# Patient Record
Sex: FEMALE | Race: WHITE | NOT HISPANIC OR LATINO | Employment: OTHER | URBAN - METROPOLITAN AREA
[De-identification: names, ages, dates, MRNs, and addresses within clinical notes are randomized per-mention and may not be internally consistent; named-entity substitution may affect disease eponyms.]

---

## 2018-01-04 ENCOUNTER — TELEPHONE (OUTPATIENT)
Dept: OBSTETRICS AND GYNECOLOGY | Facility: CLINIC | Age: 63
End: 2018-01-04

## 2018-01-04 NOTE — TELEPHONE ENCOUNTER
----- Message from Suellen Liang sent at 12/29/2017  5:48 PM CST -----  Patient is due for yearly mammogram   Can an order be entered so that  Patient can be scheduled

## 2018-02-05 ENCOUNTER — TELEPHONE (OUTPATIENT)
Dept: OBSTETRICS AND GYNECOLOGY | Facility: CLINIC | Age: 63
End: 2018-02-05

## 2018-02-05 DIAGNOSIS — Z12.31 ENCOUNTER FOR SCREENING MAMMOGRAM FOR MALIGNANT NEOPLASM OF BREAST: ICD-10-CM

## 2018-02-05 DIAGNOSIS — R92.8 ABNORMAL SCREENING MAMMOGRAM: Primary | ICD-10-CM

## 2018-02-05 NOTE — TELEPHONE ENCOUNTER
----- Message from Clotilde Montemayor sent at 2/5/2018  9:32 AM CST -----  Contact: [phone]/self  Patient would like orders put in the system for a Mammo. Please advise.

## 2018-02-19 ENCOUNTER — OFFICE VISIT (OUTPATIENT)
Dept: OBSTETRICS AND GYNECOLOGY | Facility: CLINIC | Age: 63
End: 2018-02-19
Payer: MEDICARE

## 2018-02-19 VITALS
BODY MASS INDEX: 39 KG/M2 | WEIGHT: 242.63 LBS | HEIGHT: 66 IN | DIASTOLIC BLOOD PRESSURE: 70 MMHG | SYSTOLIC BLOOD PRESSURE: 126 MMHG

## 2018-02-19 DIAGNOSIS — Z01.419 WELL WOMAN EXAM WITH ROUTINE GYNECOLOGICAL EXAM: Primary | ICD-10-CM

## 2018-02-19 PROCEDURE — 99999 PR PBB SHADOW E&M-EST. PATIENT-LVL III: CPT | Mod: PBBFAC,,, | Performed by: OBSTETRICS & GYNECOLOGY

## 2018-02-19 PROCEDURE — G0101 CA SCREEN;PELVIC/BREAST EXAM: HCPCS | Mod: S$GLB,,, | Performed by: OBSTETRICS & GYNECOLOGY

## 2018-02-19 RX ORDER — DULOXETIN HYDROCHLORIDE 30 MG/1
CAPSULE, DELAYED RELEASE ORAL
COMMUNITY
Start: 2018-01-07

## 2018-02-19 RX ORDER — APIXABAN 5 MG/1
TABLET, FILM COATED ORAL
COMMUNITY
Start: 2018-01-08

## 2018-02-19 RX ORDER — THYROID 120 MG/1
120 TABLET ORAL
COMMUNITY
Start: 2017-10-25

## 2018-02-19 RX ORDER — AMIODARONE HYDROCHLORIDE 200 MG/1
100 TABLET ORAL
COMMUNITY
Start: 2017-05-12

## 2018-02-19 RX ORDER — DOCUSATE CALCIUM 240 MG
CAPSULE ORAL
COMMUNITY
Start: 2016-04-06

## 2018-02-19 NOTE — PROGRESS NOTES
CC: Annual check-up    SUBJECTIVE:   62 y.o. female   for annual routine Pap and checkup. No LMP recorded. Patient has had a hysterectomy..  She has no unusual complaints. She is scheduled for gastric sleeve surgery in 2018.        Past Medical History:   Diagnosis Date    Atrial fibrillation 10/2017    Congestive heart failure     Diabetes mellitus     Psoriasis     Thyroid disease      Past Surgical History:   Procedure Laterality Date    ADRENALECTOMY      ATRIAL ABLATION SURGERY  2017    BACK SURGERY      CARPAL TUNNEL RELEASE       SECTION      CHOLECYSTECTOMY      HYSTERECTOMY      KNEE ARTHROSCOPY      THYROIDECTOMY       Social History     Social History    Marital status:      Spouse name: N/A    Number of children: N/A    Years of education: N/A     Occupational History    Not on file.     Social History Main Topics    Smoking status: Former Smoker    Smokeless tobacco: Former User    Alcohol use No    Drug use: No    Sexual activity: Not Currently     Other Topics Concern    Not on file     Social History Narrative    No narrative on file     Family History   Problem Relation Age of Onset    Breast cancer Maternal Aunt     Cancer Father      gallbladder    Cancer Mother      melanom    Cancer Brother      kidney     OB History    Para Term  AB Living   2 2 1 1   2   SAB TAB Ectopic Multiple Live Births                  # Outcome Date GA Lbr Janes/2nd Weight Sex Delivery Anes PTL Lv   2      F CS-LVertical      1 Term     M CS-LVertical               Current Outpatient Prescriptions   Medication Sig Dispense Refill    allopurinol (ZYLOPRIM) 300 MG tablet       amiodarone (PACERONE) 200 MG Tab Take 100 mg by mouth.      aspirin (ECOTRIN) 81 MG EC tablet       blood sugar diagnostic (TRUETEST TEST STRIPS) Strp       blood-glucose meter (New[employer]OURCE BLOOD GLUC METER) Misc       bumetanide (BUMEX) 2 MG tablet Take 1 tablet by  mouth.      cetirizine (ZYRTEC) 10 MG tablet Take 1 tablet by mouth.      docusate calcium (SURFAK) 240 mg capsule continuous prn.      DULoxetine (CYMBALTA) 30 MG capsule       ELIQUIS 5 mg Tab       EUCALYPT/MEN/CAMPH/TURP/WH.PET (MEDICATED CHEST RUB TOP)       gabapentin (NEURONTIN) 800 MG tablet Take 1 tablet by mouth.      lancets (TRUEPLUS LANCETS) 28 gauge Misc       losartan (COZAAR) 100 MG tablet Take 1 tablet by mouth.      metformin (GLUCOPHAGE) 500 MG tablet Take 1 tablet by mouth.      metoprolol succinate (TOPROL-XL) 25 MG 24 hr tablet Take 1 tablet by mouth.      multivit-min-FA-lycopen-lutein (CENTRUM SILVER) 0.4-300-250 mg-mcg-mcg Tab Take by mouth.      thyroid, pork, (ARMOUR THYROID) 120 mg Tab Take 120 mg by mouth.      DEXTROSE (GLUCOSE) Chew Take 1 tablet by mouth.      rmtay-wtdlh-tuiueut-pramoxine 3.5-500-10,000 mg-unit-unit/g Oint       ejccm-adjxf-gjvfszt-pramoxine 3.5-500-10,000 mg-unit-unit/g Oint        No current facility-administered medications for this visit.      Allergies: Contrast media; Iodinated contrast- oral and iv dye; Atorvastatin; Pravastatin; and Rosuvastatin     ROS:  Constitutional: no weight loss, weight gain, fever, fatigue  Eyes:  No vision changes, glasses/contacts  ENT/Mouth: No ulcers, sinus problems, ears ringing, headache  Cardiovascular: No inability to lie flat, chest pain, exercise intolerance, swelling, heart palpitations  Respiratory: No wheezing, coughing blood, shortness of breath, or cough  Gastrointestinal: No diarrhea, bloody stool, nausea/vomiting, constipation, gas, hemorrhoids  Genitourinary: No blood in urine, painful urination, urgency of urination, frequency of urination, incomplete emptying, incontinence, abnormal bleeding, painful periods, heavy periods, vaginal discharge, vaginal odor, painful intercourse, sexual problems, bleeding after intercourse.  Musculoskeletal: No muscle weakness  Skin/Breast: No painful breasts, nipple  "discharge, masses, rash, ulcers  Neurological: No passing out, seizures, numbness, headache  Endocrine: No diabetes, hypothyroid, hyperthyroid, hot flashes, hair loss, abnormal hair growth, ance  Psychiatric: No depression, crying  Hematologic: No bruises, bleeding, swollen lymph nodes, anemia.      OBJECTIVE:   The patient appears well, alert, oriented x 3, in no distress.  /70   Ht 5' 6" (1.676 m)   Wt 110 kg (242 lb 9.6 oz)   BMI 39.16 kg/m²   NECK: no thyromegaly, trachea midline  SKIN: no acne, striae, hirsutism  BREAST EXAM: breasts appear normal, no suspicious masses, no skin or nipple changes or axillary nodes, small subcutaneous lymph node on left lateral wall, implantable device over left medial breast  ABDOMEN: no hernias, masses, or hepatosplenomegaly  GENITALIA: normal external genitalia, no erythema, no discharge  URETHRA: normal urethra, normal urethral meatus  VAGINA: mild vaginal atrophy  CERVIX: no lesions or cervical motion tenderness and absent  UTERUS: uterus absent  ADNEXA: absent      ASSESSMENT:   well woman  1. Well woman exam with routine gynecological exam        PLAN:   mammogram  return annually or prn     "

## 2018-02-27 ENCOUNTER — HOSPITAL ENCOUNTER (OUTPATIENT)
Dept: RADIOLOGY | Facility: HOSPITAL | Age: 63
Discharge: HOME | End: 2018-02-27
Attending: OBSTETRICS & GYNECOLOGY
Payer: MEDICARE

## 2018-02-27 DIAGNOSIS — Z12.31 ENCOUNTER FOR SCREENING MAMMOGRAM FOR MALIGNANT NEOPLASM OF BREAST: ICD-10-CM

## 2018-02-27 DIAGNOSIS — R92.8 ABNORMAL SCREENING MAMMOGRAM: ICD-10-CM

## 2018-02-27 PROCEDURE — 77067 SCR MAMMO BI INCL CAD: CPT | Mod: TC,PO

## 2019-01-07 DIAGNOSIS — G44.221 CHRONIC TENSION-TYPE HEADACHE, INTRACTABLE: Primary | ICD-10-CM

## 2019-01-11 ENCOUNTER — HOSPITAL ENCOUNTER (OUTPATIENT)
Dept: RADIOLOGY | Facility: HOSPITAL | Age: 64
Discharge: HOME | End: 2019-01-11
Attending: FAMILY MEDICINE
Payer: MEDICARE

## 2019-01-11 DIAGNOSIS — G44.221 CHRONIC TENSION-TYPE HEADACHE, INTRACTABLE: ICD-10-CM

## 2019-01-11 PROCEDURE — 70450 CT HEAD/BRAIN W/O DYE: CPT | Mod: TC,PO,ER

## 2020-03-27 ENCOUNTER — PATIENT MESSAGE (OUTPATIENT)
Dept: UROLOGY | Facility: CLINIC | Age: 65
End: 2020-03-27

## 2020-04-02 ENCOUNTER — PATIENT MESSAGE (OUTPATIENT)
Dept: UROLOGY | Facility: CLINIC | Age: 65
End: 2020-04-02

## 2020-04-02 ENCOUNTER — OFFICE VISIT (OUTPATIENT)
Dept: UROLOGY | Facility: CLINIC | Age: 65
End: 2020-04-02
Payer: MEDICARE

## 2020-04-02 DIAGNOSIS — R31.0 GROSS HEMATURIA: Primary | ICD-10-CM

## 2020-04-02 DIAGNOSIS — Z87.442 HISTORY OF NEPHROLITHIASIS: ICD-10-CM

## 2020-04-02 DIAGNOSIS — Z91.041 ALLERGY TO INTRAVENOUS CONTRAST: ICD-10-CM

## 2020-04-02 DIAGNOSIS — Z79.01 LONG TERM (CURRENT) USE OF ANTICOAGULANTS: ICD-10-CM

## 2020-04-02 PROCEDURE — 99204 OFFICE O/P NEW MOD 45 MIN: CPT | Mod: 95,,, | Performed by: STUDENT IN AN ORGANIZED HEALTH CARE EDUCATION/TRAINING PROGRAM

## 2020-04-02 PROCEDURE — 99204 PR OFFICE/OUTPT VISIT, NEW, LEVL IV, 45-59 MIN: ICD-10-PCS | Mod: 95,,, | Performed by: STUDENT IN AN ORGANIZED HEALTH CARE EDUCATION/TRAINING PROGRAM

## 2020-04-02 RX ORDER — PREDNISONE 50 MG/1
TABLET ORAL
Qty: 3 TABLET | Refills: 0 | Status: SHIPPED | OUTPATIENT
Start: 2020-04-02

## 2020-04-02 NOTE — PROGRESS NOTES
Urology Telemedicine Encounter      Subjective:       Patient ID: Deborah Gamboa is a 64 y.o. female.    Chief Complaint:  hematuria  This is a 64 y.o.  female patient that is new to me.  she is referred to me by her PCP Dr. Aurelio Renee for gross hematuria.     The patient location is: [place], [place].   The chief complaint leading to consultation is: gross hematuria  Visit type: Virtual visit with synchronous audio and video  Total time spent with patient: 20 minutes  Each patient to whom he or she provides medical services by telemedicine is:  (1) informed of the relationship between the physician and patient and the respective role of any other health care provider with respect to management of the patient; and (2) notified that he or she may decline to receive medical services by telemedicine and may withdraw from such care at any time.    The patient understands that [facility] urology department typically does not utilize telemedicine, we are adapting this technology for the time being to try to avoid patients from being exposed to COVID-19 / coronavirus as per the recommendations of [facility] administration as well as the [facility] Department of Health.   I also recommend that insurance companies consider waiving a copay, if the patient is responsible for one, for telemedicine health services during the current healthcare climate dealing with a COVID-19 outbreak.     Notes:    The patient did experience gross hematuria for the first time last week. She notes it has resolved since it started last Friday and her urine is now clear again. Denies associated flank pain, fevers, chills, dysuria. She does have a family history that is significant - her brother was diagnosed with kidney cancer that sounds like it was metastatic, diagnosed in 40s, passed away in late 50s.   The patient does take blood thinners  Eliquis.   @HE@ does have a history of stones. Did not pass stones, no  surgeries, informed from imaging.   The patient does have a history of smoking. Quit 21 years ago. 15 years total.   Associated symptoms -     Had workup before- no.    ---  Past Medical History:   Diagnosis Date    Atrial fibrillation 10/2017    Congestive heart failure     Diabetes mellitus     Psoriasis     Thyroid disease        Past Surgical History:   Procedure Laterality Date    ADRENALECTOMY      ATRIAL ABLATION SURGERY  2017    BACK SURGERY      CARPAL TUNNEL RELEASE       SECTION      CHOLECYSTECTOMY      HYSTERECTOMY      KNEE ARTHROSCOPY      THYROIDECTOMY         Family History   Problem Relation Age of Onset    Breast cancer Maternal Aunt     Cancer Father         gallbladder    Cancer Mother         melanom    Cancer Brother         kidney       Social History     Tobacco Use    Smoking status: Former Smoker    Smokeless tobacco: Former User   Substance Use Topics    Alcohol use: No    Drug use: No       Current Outpatient Medications on File Prior to Visit   Medication Sig Dispense Refill    allopurinol (ZYLOPRIM) 300 MG tablet       amiodarone (PACERONE) 200 MG Tab Take 100 mg by mouth.      blood sugar diagnostic (TRUETEST TEST STRIPS) Strp       blood-glucose meter (EDMdesignerOUR[employer]ii BLOOD GLUC METER) Misc       bumetanide (BUMEX) 2 MG tablet Take 1 tablet by mouth.      cetirizine (ZYRTEC) 10 MG tablet Take 1 tablet by mouth.      DEXTROSE (GLUCOSE) Chew Take 1 tablet by mouth.      docusate calcium (SURFAK) 240 mg capsule continuous prn.      DULoxetine (CYMBALTA) 30 MG capsule       ELIQUIS 5 mg Tab       EUCALYPT/MEN/CAMPH/TURP/WH.PET (MEDICATED CHEST RUB TOP)       gabapentin (NEURONTIN) 800 MG tablet Take 1 tablet by mouth.      lancets (TRUEPLUS LANCETS) 28 gauge Misc       losartan (COZAAR) 100 MG tablet Take 1 tablet by mouth.      metformin (GLUCOPHAGE) 500 MG tablet Take 1 tablet by mouth.      metoprolol succinate (TOPROL-XL) 25 MG 24 hr  tablet Take 1 tablet by mouth.      multivit-min-FA-lycopen-lutein (CENTRUM SILVER) 0.4-300-250 mg-mcg-mcg Tab Take by mouth.      ujihn-thbyn-ourumco-pramoxine 3.5-500-10,000 mg-unit-unit/g Oint       rblcc-nckpv-ciufkri-pramoxine 3.5-500-10,000 mg-unit-unit/g Oint       thyroid, pork, (ARMOUR THYROID) 120 mg Tab Take 120 mg by mouth.      [DISCONTINUED] aspirin (ECOTRIN) 81 MG EC tablet        No current facility-administered medications on file prior to visit.      Review of patient's allergies indicates:   Allergen Reactions    Contrast media Hives and Shortness Of Breath    Iodinated contrast media Hives and Shortness Of Breath    Atorvastatin      cramps    Pravastatin      cramps    Rosuvastatin      cramps       Review of Systems   Constitutional: Negative for activity change.   HENT: Negative for congestion.    Eyes: Negative for visual disturbance.   Respiratory: Negative for shortness of breath.    Cardiovascular: Negative for chest pain.   Gastrointestinal: Negative for abdominal distention.   Musculoskeletal: Negative for gait problem.   Skin: Negative for color change.   Neurological: Negative for dizziness.   Psychiatric/Behavioral: Negative for agitation.       Objective:      Physical Exam   Constitutional: She is oriented to person, place, and time. She appears well-developed and well-nourished.   HENT:   Head: Normocephalic and atraumatic.   Eyes: Pupils are equal, round, and reactive to light. EOM are normal.   Neck: Normal range of motion.   Pulmonary/Chest: Effort normal.   Neurological: She is alert and oriented to person, place, and time.   Psychiatric: She has a normal mood and affect. Her behavior is normal. Judgment and thought content normal.         Please note that a full physical examination could not be performed due to the limitations of a telemedicine visit.   Assessment:       1. Gross hematuria    2. History of nephrolithiasis    3. Long term (current) use of  anticoagulants    4. Allergy to intravenous contrast        Plan:       1. The patient has experienced gross hematuria. I counseled the patient on the American Urology Guidelines for a hematuria workup.  The criteria for microscopic hematuria is 3 red blood cells on microscopic urinalysis and the workup is recommended for any patient experiencing gross hematuria. The workup includes a CT urogram and a flexible cystoscopy performed here in the clinic. After answering all of the questions about the workup the patient was amenable in proceeding.  2. Prednisone and Benadryl premedication before CT scan. Instructions sent to patient via portal.     Prednisone - 50 mg by mouth at 13 hours, 7 hours, and 1 hour before contrast media injection,   plus   Diphenhydramine (Benadryl®) - 50 mg by mouth 1 hour before contrast medium.    It is also good practice to hydrate generously with oral fluids until the time you are instructed to fast for the CT scan.     3. Takes metformin once scheduled will need to recheck BMP 3 days after CT to make sure can restart metformin.    4. I called Dr. Renee's office to get BMP results,  informed me they are closed. Instructed me to call tomorrow. Once BMP results demonstrate stable renal function, will schedule CT urogram and cystoscopy - must be after 4/22. Can get Ct and bloodwork in [place]. Schedule cysto with me on a different day.       Gross hematuria  -     predniSONE (DELTASONE) 50 MG Tab; Take 50 mg by mouth at 13 hours, 7 hours, and 1 hour before contrast media injection for CT scan  Dispense: 3 tablet; Refill: 0    History of nephrolithiasis  -     predniSONE (DELTASONE) 50 MG Tab; Take 50 mg by mouth at 13 hours, 7 hours, and 1 hour before contrast media injection for CT scan  Dispense: 3 tablet; Refill: 0    Long term (current) use of anticoagulants  -     predniSONE (DELTASONE) 50 MG Tab; Take 50 mg by mouth at 13 hours, 7 hours, and 1 hour before contrast media  injection for CT scan  Dispense: 3 tablet; Refill: 0    Allergy to intravenous contrast  -     predniSONE (DELTASONE) 50 MG Tab; Take 50 mg by mouth at 13 hours, 7 hours, and 1 hour before contrast media injection for CT scan  Dispense: 3 tablet; Refill: 0

## 2020-04-02 NOTE — PROGRESS NOTES
"Subjective:       Patient ID: Deborah Gamboa is a 64 y.o. female.    Chief Complaint: No chief complaint on file.   ***  This is a 64 y.o.  female patient that is {Blank single:51991::"new to me.","new to me but not new to the system.","an established patient of mine."}  The patient is {Blank single:29387::"self referred","referred to me by"} *** for ***.         LAST PSA  No results found for: PSA, PSADIAG, PSATOTAL, PSAFREE    No results found for: CREATININE    I personally reviewed the images: ***  No results found in the last 24 hours.      ---  Past Medical History:   Diagnosis Date    Atrial fibrillation 10/2017    Congestive heart failure     Diabetes mellitus     Psoriasis     Thyroid disease        Past Surgical History:   Procedure Laterality Date    ADRENALECTOMY      ATRIAL ABLATION SURGERY  2017    BACK SURGERY      CARPAL TUNNEL RELEASE       SECTION      CHOLECYSTECTOMY      HYSTERECTOMY      KNEE ARTHROSCOPY      THYROIDECTOMY         Family History   Problem Relation Age of Onset    Breast cancer Maternal Aunt     Cancer Father         gallbladder    Cancer Mother         melanom    Cancer Brother         kidney       Social History     Tobacco Use    Smoking status: Former Smoker    Smokeless tobacco: Former User   Substance Use Topics    Alcohol use: No    Drug use: No       Current Outpatient Medications on File Prior to Visit   Medication Sig Dispense Refill    allopurinol (ZYLOPRIM) 300 MG tablet       amiodarone (PACERONE) 200 MG Tab Take 100 mg by mouth.      aspirin (ECOTRIN) 81 MG EC tablet       blood sugar diagnostic (TRUETEST TEST STRIPS) Strp       blood-glucose meter (WanderableCE BLOOD GLUC METER) Misc       bumetanide (BUMEX) 2 MG tablet Take 1 tablet by mouth.      cetirizine (ZYRTEC) 10 MG tablet Take 1 tablet by mouth.      DEXTROSE (GLUCOSE) Chew Take 1 tablet by mouth.      docusate calcium (SURFAK) 240 mg capsule continuous prn.      " DULoxetine (CYMBALTA) 30 MG capsule       ELIQUIS 5 mg Tab       EUCALYPT/MEN/CAMPH/TURP/WH.PET (MEDICATED CHEST RUB TOP)       gabapentin (NEURONTIN) 800 MG tablet Take 1 tablet by mouth.      lancets (TRUEPLUS LANCETS) 28 gauge Misc       losartan (COZAAR) 100 MG tablet Take 1 tablet by mouth.      metformin (GLUCOPHAGE) 500 MG tablet Take 1 tablet by mouth.      metoprolol succinate (TOPROL-XL) 25 MG 24 hr tablet Take 1 tablet by mouth.      multivit-min-FA-lycopen-lutein (CENTRUM SILVER) 0.4-300-250 mg-mcg-mcg Tab Take by mouth.      jmreb-vuvef-kjphxmf-pramoxine 3.5-500-10,000 mg-unit-unit/g Oint       aursv-ehcvo-iumbqbg-pramoxine 3.5-500-10,000 mg-unit-unit/g Oint       thyroid, pork, (ARMOUR THYROID) 120 mg Tab Take 120 mg by mouth.       No current facility-administered medications on file prior to visit.        Review of patient's allergies indicates:   Allergen Reactions    Contrast media Hives and Shortness Of Breath    Iodinated contrast media Hives and Shortness Of Breath    Atorvastatin      cramps    Pravastatin      cramps    Rosuvastatin      cramps       Review of Systems    Objective:      Physical Exam    Assessment:       No diagnosis found.    Plan:               There are no diagnoses linked to this encounter.

## 2020-04-02 NOTE — LETTER
April 2, 2020      Aurelio Renee III, MD  [address] [place] [address]           [facility]  [address], [address]  [facility] [address]  Phone: [phone]          Patient: Deborah Gamboa   MR Number: [id number]   YOB: 1955   Date of Visit: 4/2/2020       Dear Dr. Aurelio Renee III:    Thank you for referring Deborah Gamboa to me for evaluation. Attached you will find relevant portions of my assessment and plan of care.    If you have questions, please do not hesitate to call me. I look forward to following Deborah Gamboa along with you.    Sincerely,    Ana María Crum MD    Enclosure  CC:  No Recipients    If you would like to receive this communication electronically, please contact [email or web address] or [phone] to request more information on [employer] Link access.    For providers and/or their staff who would like to refer a patient to [facility], please contact us through our one-stop-shop provider referral line, [facility], at [phone].    If you feel you have received this communication in error or would no longer like to receive these types of communications, please e-mail [email or web address]

## 2020-04-06 DIAGNOSIS — R31.0 GROSS HEMATURIA: ICD-10-CM

## 2020-04-07 DIAGNOSIS — Z79.899 ENCOUNTER FOR LONG-TERM (CURRENT) USE OF MEDICATIONS: Primary | ICD-10-CM

## 2020-04-07 DIAGNOSIS — R31.0 GROSS HEMATURIA: ICD-10-CM

## 2020-04-09 ENCOUNTER — HOSPITAL ENCOUNTER (OUTPATIENT)
Dept: RADIOLOGY | Facility: HOSPITAL | Age: 65
Discharge: HOME | End: 2020-04-09
Attending: STUDENT IN AN ORGANIZED HEALTH CARE EDUCATION/TRAINING PROGRAM
Payer: MEDICARE

## 2020-04-09 DIAGNOSIS — R31.0 GROSS HEMATURIA: ICD-10-CM

## 2020-04-09 PROCEDURE — 74178 CT ABD&PLV WO CNTR FLWD CNTR: CPT | Mod: TC,PO

## 2020-04-09 PROCEDURE — 25500020 PHARM REV CODE 255: Mod: PO | Performed by: STUDENT IN AN ORGANIZED HEALTH CARE EDUCATION/TRAINING PROGRAM

## 2020-04-09 RX ADMIN — IOHEXOL 125 ML: 350 INJECTION, SOLUTION INTRAVENOUS at 10:04

## 2020-04-13 ENCOUNTER — PROCEDURE VISIT (OUTPATIENT)
Dept: UROLOGY | Facility: CLINIC | Age: 65
End: 2020-04-13
Payer: MEDICARE

## 2020-04-13 VITALS
TEMPERATURE: 98 F | DIASTOLIC BLOOD PRESSURE: 85 MMHG | HEIGHT: 66 IN | SYSTOLIC BLOOD PRESSURE: 132 MMHG | BODY MASS INDEX: 38.89 KG/M2 | HEART RATE: 88 BPM | WEIGHT: 242 LBS

## 2020-04-13 DIAGNOSIS — R31.0 GROSS HEMATURIA: ICD-10-CM

## 2020-04-13 DIAGNOSIS — N20.1 URETERAL CALCULUS: Primary | ICD-10-CM

## 2020-04-13 LAB
BILIRUB SERPL-MCNC: ABNORMAL MG/DL
BLOOD URINE, POC: 250
COLOR, POC UA: ABNORMAL
GLUCOSE UR QL STRIP: ABNORMAL
KETONES UR QL STRIP: ABNORMAL
LEUKOCYTE ESTERASE URINE, POC: ABNORMAL
NITRITE, POC UA: ABNORMAL
PH, POC UA: 6
PROTEIN, POC: ABNORMAL
SPECIFIC GRAVITY, POC UA: 1.01
UROBILINOGEN, POC UA: 1

## 2020-04-13 PROCEDURE — 81002 POCT URINE DIPSTICK WITHOUT MICROSCOPE: ICD-10-PCS | Mod: S$GLB,,, | Performed by: STUDENT IN AN ORGANIZED HEALTH CARE EDUCATION/TRAINING PROGRAM

## 2020-04-13 PROCEDURE — 81002 URINALYSIS NONAUTO W/O SCOPE: CPT | Mod: S$GLB,,, | Performed by: STUDENT IN AN ORGANIZED HEALTH CARE EDUCATION/TRAINING PROGRAM

## 2020-04-13 PROCEDURE — 52000 PR CYSTOURETHROSCOPY: ICD-10-PCS | Mod: S$GLB,,, | Performed by: STUDENT IN AN ORGANIZED HEALTH CARE EDUCATION/TRAINING PROGRAM

## 2020-04-13 PROCEDURE — 52000 CYSTOURETHROSCOPY: CPT | Mod: S$GLB,,, | Performed by: STUDENT IN AN ORGANIZED HEALTH CARE EDUCATION/TRAINING PROGRAM

## 2020-04-13 RX ORDER — TAMSULOSIN HYDROCHLORIDE 0.4 MG/1
0.4 CAPSULE ORAL DAILY
Qty: 30 CAPSULE | Refills: 0 | Status: SHIPPED | OUTPATIENT
Start: 2020-04-13 | End: 2020-05-13

## 2020-04-13 NOTE — PROCEDURES
Procedures     Procedure:   1. Flexible cysto-uretheroscopy    Pre Procedure Diagnosis:  1. Gross hematuria    Post Procedure Diagnosis:  1. Same    Surgeon: Ana María Crum MD    Anesthesia: 2% uro-jet lidocaine jelly for local analgesia    Procedure note:  A flexible cysto-urethroscopy was performed after consent was obtained.  The risks and benefits were explained. 2% lidocaine urojet was used for local analgesia. The genitalia was prepped and draped in the sterile fashion.     The flexible scope was advanced into the urethra and into the bladder. The bilateral ureteral orifices were identified in their normal orthotopic locations. Clear bilateral ureteral efflux was identified. The bladder was completely surveyed in a systematic fashion. No bladder tumors or lesions were seen.     As the flexible cystoscope was being removed, the urethra was evaluated and noted to be normal.     The patient tolerated the procedure well without complication.       Findings in summary:  1. Clear efflux from left and right ureteral orifices were seen, no bloody efflux appreciated.  2. Bladder mucosa inspected twice, no abnormal mucosa appreciated. No findings suspicious for tumors were appreciated.    Assessment: 65 y.o. female with gross hematuria     Plan:  1. Cystoscopy - benign (see note above).  2. CT urogram - no hydronephrosis. Right proximal 1-2 mm ureteral stone seen. Possible nonobstructing stones in right also seen - at parenchymal border possible renal calcifications. No filling defects appreciated. No kidney masses seen. Renal cysts - right lower and two in left kidney seen - counseled patient this is incidental and benign.  3. Since patient is clinically asymptomatic will have her hydrate, take flomax for 30 days and repeat a noncontrasted CT in 1 month. Will message online with results. Counseled pt can push back further if COVID situation is still serious and if patient is doing well symptomatically.  4. Stone  strainer and cup provided to patient.  5. Patient does not take metformin, therefore no need to repeat BMP.

## 2020-05-13 ENCOUNTER — HOSPITAL ENCOUNTER (OUTPATIENT)
Dept: RADIOLOGY | Facility: HOSPITAL | Age: 65
Discharge: HOME | End: 2020-05-13
Attending: STUDENT IN AN ORGANIZED HEALTH CARE EDUCATION/TRAINING PROGRAM
Payer: MEDICARE

## 2020-05-13 DIAGNOSIS — N20.1 URETERAL CALCULUS: ICD-10-CM

## 2020-05-13 PROCEDURE — 74176 CT ABD & PELVIS W/O CONTRAST: CPT | Mod: TC,PO

## 2020-05-14 ENCOUNTER — PATIENT MESSAGE (OUTPATIENT)
Dept: UROLOGY | Facility: CLINIC | Age: 65
End: 2020-05-14

## 2021-03-22 ENCOUNTER — HOSPITAL ENCOUNTER (EMERGENCY)
Facility: HOSPITAL | Age: 66
Discharge: HOME | End: 2021-03-22
Attending: FAMILY MEDICINE
Payer: MEDICARE

## 2021-03-22 VITALS
SYSTOLIC BLOOD PRESSURE: 143 MMHG | TEMPERATURE: 99 F | DIASTOLIC BLOOD PRESSURE: 80 MMHG | BODY MASS INDEX: 33.91 KG/M2 | RESPIRATION RATE: 18 BRPM | HEART RATE: 82 BPM | HEIGHT: 66 IN | OXYGEN SATURATION: 99 % | WEIGHT: 211 LBS

## 2021-03-22 DIAGNOSIS — I10 HTN (HYPERTENSION): ICD-10-CM

## 2021-03-22 PROCEDURE — 25000003 PHARM REV CODE 250: Mod: ER | Performed by: FAMILY MEDICINE

## 2021-03-22 PROCEDURE — 93005 ELECTROCARDIOGRAM TRACING: CPT | Mod: ER

## 2021-03-22 PROCEDURE — 93010 EKG 12-LEAD: ICD-10-PCS | Mod: ,,, | Performed by: INTERNAL MEDICINE

## 2021-03-22 PROCEDURE — 99283 EMERGENCY DEPT VISIT LOW MDM: CPT | Mod: 25,ER

## 2021-03-22 PROCEDURE — 93010 ELECTROCARDIOGRAM REPORT: CPT | Mod: ,,, | Performed by: INTERNAL MEDICINE

## 2021-03-22 RX ORDER — ACETAMINOPHEN 325 MG/1
650 TABLET ORAL
Status: COMPLETED | OUTPATIENT
Start: 2021-03-22 | End: 2021-03-22

## 2021-03-22 RX ADMIN — ACETAMINOPHEN 650 MG: 325 TABLET ORAL at 02:03

## 2021-03-31 DIAGNOSIS — Z12.31 BREAST CANCER SCREENING BY MAMMOGRAM: Primary | ICD-10-CM

## 2021-04-01 ENCOUNTER — HOSPITAL ENCOUNTER (OUTPATIENT)
Dept: RADIOLOGY | Facility: HOSPITAL | Age: 66
Discharge: HOME | End: 2021-04-01
Attending: SURGERY
Payer: MEDICARE

## 2021-04-01 DIAGNOSIS — Z12.31 BREAST CANCER SCREENING BY MAMMOGRAM: ICD-10-CM

## 2021-04-01 PROCEDURE — 77067 SCR MAMMO BI INCL CAD: CPT | Mod: TC,PO

## 2024-07-09 ENCOUNTER — TELEPHONE (OUTPATIENT)
Dept: FAMILY MEDICINE | Facility: CLINIC | Age: 69
End: 2024-07-09

## 2024-08-20 ENCOUNTER — HOSPITAL ENCOUNTER (EMERGENCY)
Facility: HOSPITAL | Age: 69
Discharge: HOME | End: 2024-08-20
Attending: EMERGENCY MEDICINE
Payer: MEDICARE

## 2024-08-20 VITALS
HEIGHT: 66 IN | DIASTOLIC BLOOD PRESSURE: 74 MMHG | TEMPERATURE: 99 F | HEART RATE: 70 BPM | RESPIRATION RATE: 20 BRPM | OXYGEN SATURATION: 96 % | SYSTOLIC BLOOD PRESSURE: 122 MMHG | BODY MASS INDEX: 26.52 KG/M2 | WEIGHT: 165 LBS

## 2024-08-20 DIAGNOSIS — U07.1 COVID-19: Primary | ICD-10-CM

## 2024-08-20 DIAGNOSIS — U07.1 COVID-19 VIRUS DETECTED: ICD-10-CM

## 2024-08-20 LAB
GROUP A STREP, MOLECULAR: NEGATIVE
INFLUENZA A, MOLECULAR: NEGATIVE
INFLUENZA B, MOLECULAR: NEGATIVE
SARS-COV-2 RDRP RESP QL NAA+PROBE: POSITIVE
SPECIMEN SOURCE: NORMAL

## 2024-08-20 PROCEDURE — 99283 EMERGENCY DEPT VISIT LOW MDM: CPT | Mod: 25,ER

## 2024-08-20 PROCEDURE — 87502 INFLUENZA DNA AMP PROBE: CPT | Mod: ER

## 2024-08-20 PROCEDURE — U0002 COVID-19 LAB TEST NON-CDC: HCPCS | Mod: ER

## 2024-08-20 PROCEDURE — 87651 STREP A DNA AMP PROBE: CPT | Mod: ER

## 2024-08-20 RX ORDER — ACETAMINOPHEN 500 MG
1000 TABLET ORAL 4 TIMES DAILY
Qty: 112 TABLET | Refills: 0 | Status: SHIPPED | OUTPATIENT
Start: 2024-08-20 | End: 2024-09-03

## 2024-08-20 RX ORDER — CETIRIZINE HYDROCHLORIDE 10 MG/1
10 TABLET ORAL DAILY
Qty: 30 TABLET | Refills: 0 | Status: SHIPPED | OUTPATIENT
Start: 2024-08-20 | End: 2024-09-19

## 2024-08-20 RX ORDER — BENZONATATE 200 MG/1
200 CAPSULE ORAL 3 TIMES DAILY PRN
Qty: 30 CAPSULE | Refills: 0 | Status: SHIPPED | OUTPATIENT
Start: 2024-08-20 | End: 2024-08-30

## 2024-08-20 NOTE — ED PROVIDER NOTES
Encounter Date: 2024       History     Chief Complaint   Patient presents with    Fever     Presents to ED for fever, nasal congestion, sore throat, bilateral ear pain, headaches, and bodyaches, since last night took a home COVID test it was negative had motrin at 4:30 and tylenol at 1200 noon.       Deborah Gamboa is a 69 y.o. female  has a past medical history of Atrial fibrillation, Congestive heart failure, Diabetes mellitus, Psoriasis, and Thyroid disease. presenting to the Emergency Department for fever, nasal congestion, sore throat, nonproductive cough, body aches since yesterday.  No shortness of breath or chest pain.  No nausea, vomiting, abdominal pain, diarrhea, urinary symptoms.  No diarrhea or constipation.  Patient's last took Motrin at 4 p.m. and Tylenol at noon.  Patient took a COVID test at home that was negative.        The history is provided by the patient.     Review of patient's allergies indicates:   Allergen Reactions    Contrast media Hives and Shortness Of Breath    Iodinated contrast media Hives and Shortness Of Breath     Other Reaction(s): Whelps; Trouble breathing    Atorvastatin      cramps    Iodine     Pravastatin      cramps    Rosuvastatin      cramps     Past Medical History:   Diagnosis Date    Atrial fibrillation 10/2017    Congestive heart failure     Diabetes mellitus     Psoriasis     Thyroid disease      Past Surgical History:   Procedure Laterality Date    ADRENALECTOMY      ATRIAL ABLATION SURGERY  2017    BACK SURGERY      CARPAL TUNNEL RELEASE       SECTION      CHOLECYSTECTOMY      HYSTERECTOMY      KNEE ARTHROSCOPY      THYROIDECTOMY       Family History   Problem Relation Name Age of Onset    Breast cancer Maternal Aunt      Cancer Father          gallbladder    Cancer Mother          melanom    Cancer Brother          kidney     Social History     Tobacco Use    Smoking status: Former    Smokeless tobacco: Former   Substance Use Topics    Alcohol use:  No    Drug use: No     Review of Systems   Constitutional:  Positive for fever. Negative for chills.   HENT:  Positive for congestion and sore throat. Negative for rhinorrhea.    Respiratory:  Positive for cough. Negative for shortness of breath.    Cardiovascular:  Negative for chest pain.   Gastrointestinal:  Negative for abdominal pain, diarrhea, nausea and vomiting.   Genitourinary:  Negative for dysuria.   Musculoskeletal:  Positive for myalgias. Negative for back pain.   Skin:  Negative for rash.   Neurological:  Negative for weakness.   Hematological:  Does not bruise/bleed easily.   All other systems reviewed and are negative.      Physical Exam     Initial Vitals [08/20/24 1812]   BP Pulse Resp Temp SpO2   119/68 74 (!) 22 99.3 °F (37.4 °C) 95 %      MAP       --         Physical Exam    Nursing note and vitals reviewed.  Constitutional: She appears well-developed and well-nourished. She is not diaphoretic. No distress.   HENT:   Head: Normocephalic.   Right Ear: Hearing, tympanic membrane and external ear normal.   Left Ear: Hearing, tympanic membrane and external ear normal.   Nose: Nose normal.   Mouth/Throat: Mucous membranes are normal. Posterior oropharyngeal erythema present.   Eyes: Conjunctivae, EOM and lids are normal. Pupils are equal, round, and reactive to light.   Neck: Neck supple.   Normal range of motion.  Cardiovascular:  Normal rate, regular rhythm and normal heart sounds.           Pulmonary/Chest: No respiratory distress. She has decreased breath sounds in the right upper field. She has no wheezes. She has no rhonchi.   Abdominal: Abdomen is soft. Bowel sounds are normal. There is no abdominal tenderness. There is no rebound and no guarding.   Musculoskeletal:         General: Normal range of motion.      Cervical back: Normal range of motion and neck supple. No rigidity.     Lymphadenopathy:     She has no cervical adenopathy.   Neurological: She is alert and oriented to person, place,  and time. She has normal strength. GCS score is 15. GCS eye subscore is 4. GCS verbal subscore is 5. GCS motor subscore is 6.   Skin: Skin is warm and dry. Capillary refill takes less than 2 seconds. No rash noted.   Psychiatric: She has a normal mood and affect. Her behavior is normal. Judgment and thought content normal.         ED Course   Procedures  Labs Reviewed   SARS-COV-2 RNA AMPLIFICATION, QUAL - Abnormal       Result Value    SARS-CoV-2 RNA, Amplification, Qual Positive (*)    INFLUENZA A & B BY MOLECULAR    Influenza A, Molecular Negative      Influenza B, Molecular Negative      Flu A & B Source Nasal swab     GROUP A STREP, MOLECULAR    Group A Strep, Molecular Negative            Imaging Results              X-Ray Chest PA And Lateral (Final result)  Result time 08/20/24 18:52:22      Final result by Cary Hackett MD (08/20/24 18:52:22)                   Impression:      No active cardiopulmonary finding      Electronically signed by: Cary Hackett  Date:    08/20/2024  Time:    18:52               Narrative:    EXAMINATION:  XR CHEST PA AND LATERAL    CLINICAL HISTORY:  Cough, unspecified    TECHNIQUE:  PA and lateral views of the chest were performed.    COMPARISON:  None    FINDINGS:  No pulmonary consolidation or pleural effusion.  Mediastinal surgical change in pacemaker                                       Medications - No data to display  Medical Decision Making  This is an emergent evaluation of a 69 y.o. female that presents to the Emergency Department for nonproductive cough, nasal congestion, ear congestion bilateral, sore throat, fever, and myalgias. The patient is a non-toxic and not acutely ill-appearing, afebrile, and well appearing female. Pertinent physical exam findings above. Appears well hydrated with moist mucus membranes. Neck soft and supple with no meningeal signs. Breath sounds are clear and equal bilaterally. No tachypnea or respiratory distress and no evidence of  hypoxia or cyanosis. Vital signs are reassuring.      My overall impression is COVID-19. Differential Diagnosis: Including but not limited to Sepsis, meningitis, nasal/aspirated foreign body, OM, OE, nasal polyp, bacterial sinusitis, allergic rhinitis, peritonsillar abscess, retropharyngeal abscess, epiglottitis, bacterial/viral pneumonia, bacterial/viral pharyngitis, croup, bronchiolitis, influenza, viral syndrome     Discharge Meds/Instructions: Supportive care, Tylenol/Ibuprofen PRN, Hydration.      There does not appear to be any indication for further emergent testing, observation, or hospitalization at this time. A mutual shared decision making discussion was had with the patient. Patient appears stable for and is comfortable with discharge home. The diagnosis, treatment plan, instructions for follow-up as well as ED return precautions were discussed. Advised to follow-up with PCP for outpatient follow-up in 2-3 days. Signs and symptoms that would warrant immediate return to ED were reviewed prior to discharge. All questions and concerns were asked, answered, and addressed. Patient expressed understanding and agreement with the plan.     Amount and/or Complexity of Data Reviewed  Labs: ordered. Decision-making details documented in ED Course.  Radiology: ordered and independent interpretation performed. Decision-making details documented in ED Course.    Risk  OTC drugs.  Prescription drug management.               ED Course as of 08/20/24 1945   Tue Aug 20, 2024   1918 SARS-CoV-2 RNA, Amplification, Qual(!): Positive [LH]   1919 Influenza A, Molecular: Negative []   1919 Influenza B, Molecular: Negative []   1919 Group A Strep, Molecular: Negative [LH]   1919 X-Ray Chest PA And Lateral  Independent interpretation by me: no lung consolidation, effusion, or pneumothorax. Cardiac silhouette appears normal. I have also read the radiologist's report and agree with their findings.    [LH]      ED Course User  Index  [LH] Luciana Galarza PA-C                           Clinical Impression:  Final diagnoses:  [U07.1] COVID-19 (Primary)          ED Disposition Condition    Discharge Stable          ED Prescriptions       Medication Sig Dispense Start Date End Date Auth. Provider    acetaminophen (TYLENOL) 500 MG tablet Take 2 tablets (1,000 mg total) by mouth 4 (four) times daily. for 14 days 112 tablet 8/20/2024 9/3/2024 Luciana Galarza PA-C    cetirizine (ZYRTEC) 10 MG tablet Take 1 tablet (10 mg total) by mouth once daily. 30 tablet 8/20/2024 9/19/2024 Luciana Galarza PA-C    benzonatate (TESSALON) 200 MG capsule Take 1 capsule (200 mg total) by mouth 3 (three) times daily as needed. 30 capsule 8/20/2024 8/30/2024 Luciana Galarza PA-C          Follow-up Information    None          Luciana Galarza PA-C  08/20/24 1945

## 2024-08-21 NOTE — DISCHARGE INSTRUCTIONS
You have been diagnosed with COVID-19.   - Rest.    - Drink plenty of fluids.  - Avoid crowds and wash your hands.   - Viral upper respiratory infections typically run their course in 10-14 days.   - Tylenol (acetaminophen) or Ibuprofen as directed as needed for fever/pain. You may take 1000 mg of tylenol three times a day. Avoid tylenol if you have a history of liver disease. You may take 800 mg of ibuprofen three times a day. Do not take ibuprofen if you have a history of GI bleeding, kidney disease, gastric surgery, if you take blood thinners, or if you are pregnant.   - You can take the cetirizine/zyrtec as directed. These are antihistamines that can help with runny nose, nasal congestion, sneezing, and helps to dry up post-nasal drip, which usually causes sore throat and cough.  - You can use Flonase (fluticasone) nasal spray as directed for sinus congestion and postnasal drip. This is a steroid nasal spray that works locally over time to decrease the inflammation in your nose/sinuses and help with allergic symptoms. This is not an quick- relief spray like afrin, but it works well if used daily.  Discontinue if you develop a nose bleed.  - use nasal saline prior to Flonase.  - Use Ocean Spray Nasal Saline 1-3 puffs each nostril every 2-3 hours then blow out onto tissue. This is to irrigate the nasal passage way to clear the sinus openings. Use until sinus problem resolved.  - Warm salt water gargles, cough drops, and chloraseptic spray can help with sore throat.  - Warm tea with honey can help with cough and sore throat. Honey is a natural cough suppressant.  - Dextromethorphan (DM) is a cough suppressant over the counter (ie. mucinex DM, robitussin, delsym; dayquil/nyquil has DM as well.  - Please AVOID over the counter medications URI medications that have Oxymetazoline, Phenylephrine, or Pseudoephedrine if you have high blood pressure.  Most over the counter medications will write on the box if they are safe  vs should be avoided in patients with HTN, or you can always bring the box to the pharmacist and ask if you have any questions.    - Please make an appointment with your primary care provider in the next 3 days if symptoms not improved.

## 2024-08-28 LAB
LEFT EYE DM RETINOPATHY: NEGATIVE
RIGHT EYE DM RETINOPATHY: NEGATIVE

## 2024-09-09 ENCOUNTER — OFFICE VISIT (OUTPATIENT)
Dept: FAMILY MEDICINE | Facility: CLINIC | Age: 69
End: 2024-09-09
Payer: MEDICARE

## 2024-09-09 ENCOUNTER — HOSPITAL ENCOUNTER (OUTPATIENT)
Dept: RADIOLOGY | Facility: HOSPITAL | Age: 69
Discharge: HOME | End: 2024-09-09
Attending: STUDENT IN AN ORGANIZED HEALTH CARE EDUCATION/TRAINING PROGRAM
Payer: MEDICARE

## 2024-09-09 VITALS
DIASTOLIC BLOOD PRESSURE: 84 MMHG | HEIGHT: 66 IN | BODY MASS INDEX: 28.9 KG/M2 | SYSTOLIC BLOOD PRESSURE: 130 MMHG | TEMPERATURE: 98 F | HEART RATE: 82 BPM | WEIGHT: 179.81 LBS | OXYGEN SATURATION: 94 %

## 2024-09-09 DIAGNOSIS — E11.9 TYPE 2 DIABETES MELLITUS WITHOUT COMPLICATION, WITHOUT LONG-TERM CURRENT USE OF INSULIN: ICD-10-CM

## 2024-09-09 DIAGNOSIS — G47.33 OSA (OBSTRUCTIVE SLEEP APNEA): ICD-10-CM

## 2024-09-09 DIAGNOSIS — I10 PRIMARY HYPERTENSION: ICD-10-CM

## 2024-09-09 DIAGNOSIS — M79.605 PAIN OF LEFT LOWER EXTREMITY: Primary | ICD-10-CM

## 2024-09-09 DIAGNOSIS — I48.0 PAROXYSMAL A-FIB: ICD-10-CM

## 2024-09-09 DIAGNOSIS — Z95.0 PACEMAKER: ICD-10-CM

## 2024-09-09 DIAGNOSIS — M79.605 PAIN OF LEFT LOWER EXTREMITY: ICD-10-CM

## 2024-09-09 DIAGNOSIS — I25.10 CORONARY ARTERY DISEASE INVOLVING NATIVE CORONARY ARTERY OF NATIVE HEART, UNSPECIFIED WHETHER ANGINA PRESENT: ICD-10-CM

## 2024-09-09 DIAGNOSIS — E55.9 VITAMIN D DEFICIENCY: ICD-10-CM

## 2024-09-09 DIAGNOSIS — Z12.31 SCREENING MAMMOGRAM FOR HIGH-RISK PATIENT: ICD-10-CM

## 2024-09-09 PROCEDURE — 3079F DIAST BP 80-89 MM HG: CPT | Mod: CPTII,S$GLB,, | Performed by: STUDENT IN AN ORGANIZED HEALTH CARE EDUCATION/TRAINING PROGRAM

## 2024-09-09 PROCEDURE — 3044F HG A1C LEVEL LT 7.0%: CPT | Mod: CPTII,S$GLB,, | Performed by: STUDENT IN AN ORGANIZED HEALTH CARE EDUCATION/TRAINING PROGRAM

## 2024-09-09 PROCEDURE — 4010F ACE/ARB THERAPY RXD/TAKEN: CPT | Mod: CPTII,S$GLB,, | Performed by: STUDENT IN AN ORGANIZED HEALTH CARE EDUCATION/TRAINING PROGRAM

## 2024-09-09 PROCEDURE — 3075F SYST BP GE 130 - 139MM HG: CPT | Mod: CPTII,S$GLB,, | Performed by: STUDENT IN AN ORGANIZED HEALTH CARE EDUCATION/TRAINING PROGRAM

## 2024-09-09 PROCEDURE — 99204 OFFICE O/P NEW MOD 45 MIN: CPT | Mod: S$GLB,,, | Performed by: STUDENT IN AN ORGANIZED HEALTH CARE EDUCATION/TRAINING PROGRAM

## 2024-09-09 PROCEDURE — 73590 X-RAY EXAM OF LOWER LEG: CPT | Mod: TC,FY,PN,LT

## 2024-09-09 PROCEDURE — 1159F MED LIST DOCD IN RCRD: CPT | Mod: CPTII,S$GLB,, | Performed by: STUDENT IN AN ORGANIZED HEALTH CARE EDUCATION/TRAINING PROGRAM

## 2024-09-09 PROCEDURE — 1160F RVW MEDS BY RX/DR IN RCRD: CPT | Mod: CPTII,S$GLB,, | Performed by: STUDENT IN AN ORGANIZED HEALTH CARE EDUCATION/TRAINING PROGRAM

## 2024-09-09 PROCEDURE — 73590 X-RAY EXAM OF LOWER LEG: CPT | Mod: 26,LT,, | Performed by: RADIOLOGY

## 2024-09-09 PROCEDURE — 1101F PT FALLS ASSESS-DOCD LE1/YR: CPT | Mod: CPTII,S$GLB,, | Performed by: STUDENT IN AN ORGANIZED HEALTH CARE EDUCATION/TRAINING PROGRAM

## 2024-09-09 PROCEDURE — 3008F BODY MASS INDEX DOCD: CPT | Mod: CPTII,S$GLB,, | Performed by: STUDENT IN AN ORGANIZED HEALTH CARE EDUCATION/TRAINING PROGRAM

## 2024-09-09 PROCEDURE — 1126F AMNT PAIN NOTED NONE PRSNT: CPT | Mod: CPTII,S$GLB,, | Performed by: STUDENT IN AN ORGANIZED HEALTH CARE EDUCATION/TRAINING PROGRAM

## 2024-09-09 PROCEDURE — 3288F FALL RISK ASSESSMENT DOCD: CPT | Mod: CPTII,S$GLB,, | Performed by: STUDENT IN AN ORGANIZED HEALTH CARE EDUCATION/TRAINING PROGRAM

## 2024-09-09 RX ORDER — SEMAGLUTIDE 2.68 MG/ML
2 INJECTION, SOLUTION SUBCUTANEOUS
Qty: 3 ML | Refills: 11 | Status: SHIPPED | OUTPATIENT
Start: 2024-09-09 | End: 2024-09-10 | Stop reason: SDUPTHER

## 2024-09-09 RX ORDER — TELMISARTAN 80 MG/1
80 TABLET ORAL DAILY
COMMUNITY

## 2024-09-09 RX ORDER — HYDRALAZINE HYDROCHLORIDE 50 MG/1
50 TABLET, FILM COATED ORAL 3 TIMES DAILY
COMMUNITY

## 2024-09-09 RX ORDER — CHOLECALCIFEROL (VITAMIN D3) 25 MCG
1000 TABLET ORAL DAILY
COMMUNITY

## 2024-09-09 RX ORDER — WARFARIN SODIUM 5 MG/1
5 TABLET ORAL DAILY
COMMUNITY

## 2024-09-09 RX ORDER — SOTALOL HYDROCHLORIDE 120 MG/1
80 TABLET ORAL 2 TIMES DAILY
COMMUNITY

## 2024-09-09 RX ORDER — LEVOTHYROXINE SODIUM 100 UG/1
100 TABLET ORAL
Qty: 90 TABLET | Refills: 3 | Status: SHIPPED | OUTPATIENT
Start: 2024-09-09 | End: 2024-09-10 | Stop reason: SDUPTHER

## 2024-09-10 ENCOUNTER — TELEPHONE (OUTPATIENT)
Dept: FAMILY MEDICINE | Facility: CLINIC | Age: 69
End: 2024-09-10
Payer: MEDICARE

## 2024-09-10 RX ORDER — LEVOTHYROXINE SODIUM 100 UG/1
100 TABLET ORAL
Qty: 90 TABLET | Refills: 3 | Status: SHIPPED | OUTPATIENT
Start: 2024-09-10

## 2024-09-10 RX ORDER — SEMAGLUTIDE 2.68 MG/ML
2 INJECTION, SOLUTION SUBCUTANEOUS
Qty: 3 ML | Refills: 11 | Status: SHIPPED | OUTPATIENT
Start: 2024-09-10

## 2024-09-10 NOTE — TELEPHONE ENCOUNTER
----- Message from Marcial Rosado sent at 9/10/2024  8:03 AM CDT -----  .Type:  Pharmacy Calling to Clarify an RX    Name of Caller:Jennifer Foster  Pharmacy Name:[facility] - [facility] [address]  Prescription Name:semaglutide (OZEMPIC) 2 mg/dose (8 mg/3 mL) PnIj  What do they need to clarify?:correct insurance and make sure medication was sent to the right pharmacy  Best Call Back Number:[phone]  Additional Information:

## 2024-09-10 NOTE — TELEPHONE ENCOUNTER
I spoke with pt. She is requesting that you send Ozempic and Levothyroxine to [facility]

## 2024-09-10 NOTE — TELEPHONE ENCOUNTER
----- Message from Daniel Olvera MD sent at 9/9/2024  5:23 PM CDT -----  No fracture present, but it does look like the bone is bruised.  This will take some time to heal.  I recommend elevation and applying ice when it is painful.

## 2024-09-11 ENCOUNTER — PATIENT OUTREACH (OUTPATIENT)
Dept: ADMINISTRATIVE | Facility: HOSPITAL | Age: 69
End: 2024-09-11
Payer: MEDICARE

## 2024-09-11 NOTE — PROGRESS NOTES
09/11/2024  VB chart audit performed. Care Everywhere updates requested and reviewed  Overdue HM topic chart audit and/or requested. LINKS triggered and reconciled. Media reviewed

## 2024-09-11 NOTE — PROGRESS NOTES
Patient ID: Deborah Gamboa is a 69 y.o. female. This patient is new to me.    Chief Complaint: Establish Care    HPI  Patient here to establish care.  She has a history of paroxysmal atrial fibrillation as well as diabetes and hypertension.  She also has a history of thyroid disease.  She is compliant with all medications.  She follows regularly with cardiology.    She complains of pain to the area anterior aspect of the left lower leg.  This has been present for a few weeks and is worse with palpitation.  She does not recall any trauma to the area.      Past Medical History:   Diagnosis Date    Atrial fibrillation 10/2017    Congestive heart failure     Diabetes mellitus     Psoriasis     Thyroid disease        Past Surgical History:   Procedure Laterality Date    ADRENALECTOMY      ATRIAL ABLATION SURGERY  2017    BACK SURGERY      CARPAL TUNNEL RELEASE       SECTION      CHOLECYSTECTOMY      HYSTERECTOMY      KNEE ARTHROSCOPY      THYROIDECTOMY         Family History   Problem Relation Name Age of Onset    Breast cancer Maternal Aunt      Cancer Father          gallbladder    Cancer Mother          melanom    Cancer Brother          kidney       Social History     Tobacco Use    Smoking status: Former    Smokeless tobacco: Former   Substance Use Topics    Alcohol use: No    Drug use: No       Review of patient's allergies indicates:   Allergen Reactions    Contrast media Hives and Shortness Of Breath    Iodinated contrast media Hives and Shortness Of Breath     Other Reaction(s): Whelps; Trouble breathing    Atorvastatin      cramps    Iodine     Pravastatin      cramps    Rosuvastatin      cramps        Review of Systems  Review of Systems   Constitutional:  Negative for fever.   HENT:  Negative for ear pain and sinus pain.    Eyes:  Negative for discharge.   Respiratory:  Negative for cough and shortness of breath.    Cardiovascular:  Negative for chest pain and leg swelling.   Gastrointestinal:   "Negative for diarrhea, nausea and vomiting.   Genitourinary:  Negative for urgency.   Musculoskeletal:  Negative for myalgias.   Skin:  Negative for rash.   Neurological:  Negative for weakness and headaches.   Psychiatric/Behavioral:  Negative for depression.    All other systems reviewed and are negative.      Currently Medications  Current Outpatient Medications on File Prior to Visit   Medication Sig Dispense Refill    blood sugar diagnostic (TRUETEST TEST STRIPS) Strp       blood-glucose meter ([employer] BLOOD GLUC METER) Misc       lancets (TRUEPLUS LANCETS) 28 gauge Misc       metoprolol succinate (TOPROL-XL) 25 MG 24 hr tablet Take 1 tablet by mouth.      multivit-min-FA-lycopen-lutein (CENTRUM SILVER) 0.4-300-250 mg-mcg-mcg Tab Take by mouth.      DEXTROSE (GLUCOSE) Chew Take 1 tablet by mouth.      EUCALYPT/MEN/CAMPH/TURP/WH.PET (MEDICATED CHEST RUB TOP)       gabapentin (NEURONTIN) 800 MG tablet Take 1 tablet by mouth.      hydrALAZINE (APRESOLINE) 50 MG tablet Take 50 mg by mouth 3 (three) times daily.      sotaloL (BETAPACE) 120 MG Tab Take 80 mg by mouth 2 (two) times daily.      telmisartan (MICARDIS) 80 MG Tab Take 80 mg by mouth once daily.      vitamin D (VITAMIN D3) 1000 units Tab Take 1,000 Units by mouth once daily.      warfarin (COUMADIN) 5 MG tablet Take 5 mg by mouth once daily.       No current facility-administered medications on file prior to visit.       Physical  Exam  Vitals:    09/09/24 1017   BP: 130/84   BP Location: Right arm   Patient Position: Sitting   Pulse: 82   Temp: 97.8 °F (36.6 °C)   SpO2: (!) 94%   Weight: 81.5 kg (179 lb 12.6 oz)   Height: 5' 6" (1.676 m)      Body mass index is 29.02 kg/m².    Physical Exam  Vitals and nursing note reviewed.   Constitutional:       General: She is not in acute distress.     Appearance: She is not ill-appearing.   HENT:      Head: Normocephalic and atraumatic.      Right Ear: External ear normal.      Left Ear: External ear normal.     "  Nose: Nose normal.      Mouth/Throat:      Mouth: Mucous membranes are moist.   Eyes:      Extraocular Movements: Extraocular movements intact.      Conjunctiva/sclera: Conjunctivae normal.   Cardiovascular:      Rate and Rhythm: Normal rate and regular rhythm.      Pulses: Normal pulses.      Heart sounds: No murmur heard.  Pulmonary:      Effort: Pulmonary effort is normal. No respiratory distress.      Breath sounds: No wheezing.   Abdominal:      General: There is no distension.      Palpations: Abdomen is soft. There is no mass.      Tenderness: There is no abdominal tenderness.   Musculoskeletal:         General: No swelling.      Cervical back: Normal range of motion.   Skin:     Coloration: Skin is not jaundiced.      Findings: No rash.   Neurological:      General: No focal deficit present.      Mental Status: She is alert and oriented to person, place, and time.   Psychiatric:         Mood and Affect: Mood normal.         Thought Content: Thought content normal.         Labs:    Complete Blood Count  Lab Results   Component Value Date    RBC 4.66 09/09/2024    HGB 13.9 09/09/2024    HCT 42.8 09/09/2024    MCV 92 09/09/2024    MCH 29.8 09/09/2024    MCHC 32.5 09/09/2024    RDW 13.2 09/09/2024     09/09/2024    MPV 9.5 09/09/2024    GRAN 3.4 09/09/2024    GRAN 52.1 09/09/2024    LYMPH 2.3 09/09/2024    LYMPH 35.7 09/09/2024    MONO 0.6 09/09/2024    MONO 9.0 09/09/2024    EOS 0.1 09/09/2024    BASO 0.07 09/09/2024    EOSINOPHIL 1.8 09/09/2024    BASOPHIL 1.1 09/09/2024    DIFFMETHOD Automated 09/09/2024       Comprehensive Metabolic Panel  Lab Results   Component Value Date    GLU 82 09/09/2024    BUN 14 09/09/2024    CREATININE 0.75 09/09/2024     09/09/2024    K 4.6 09/09/2024     09/09/2024    PROT 7.6 09/09/2024    ALBUMIN 4.2 09/09/2024    BILITOT 0.5 09/09/2024    AST 38 09/09/2024    ALKPHOS 61 09/09/2024    CO2 33 (H) 09/09/2024    ALT 25 09/09/2024    ANIONGAP 4 (L) 09/09/2024        TSH  Lab Results   Component Value Date    TSH 2.370 09/09/2024       Imaging:  X-Ray Tibia Fibula 2 View Left  Narrative: EXAMINATION:  XR TIBIA FIBULA 2 VIEW LEFT    CLINICAL HISTORY:  XR TIBIA FIBULA 2 VIEW LEFTPain in left leg    COMPARISON:  None    FINDINGS:  Two views of the left tibia/fibula were obtained.    No evidence of acute fracture or dislocation.  Bony mineralization is normal.  Calcification along the proximal lateral collateral ligament could reflect remote injury.  Soft tissues are unremarkable.   Clips are seen posterior and medial to the left knee.  Impression: No acute fracture or dislocation.    Electronically signed by: Rajeev Holley MD  Date:    09/09/2024  Time:    12:10      Assessment/Plan:    1. Screening mammogram for high-risk patient  -     Mammo Digital Screening Bilat w/ Dinh; Future; Expected date: 09/09/2024    2. Type 2 diabetes mellitus without complication, without long-term current use of insulin  -     HEMOGLOBIN A1C; Future; Expected date: 09/09/2024  -     TSH; Future; Expected date: 09/09/2024  -     Microalbumin/Creatinine Ratio, Urine; Future; Expected date: 09/09/2024  -     CBC Auto Differential; Future  -     Comprehensive metabolic panel; Future; Expected date: 09/09/2024  -     HEMOGLOBIN A1C; Future; Expected date: 09/09/2024  -     TSH; Future; Expected date: 09/09/2024    3. Coronary artery disease involving native coronary artery of native heart, unspecified whether angina present  -     CBC Auto Differential; Future  -     Comprehensive metabolic panel; Future; Expected date: 09/09/2024  -     LIPID PANEL; Future; Expected date: 09/09/2024  -     LIPID PANEL; Future; Expected date: 09/09/2024    4. Pain of left lower extremity  -     X-Ray Tibia Fibula 2 View Left; Future; Expected date: 09/09/2024    5. Pacemaker    6. Paroxysmal A-fib    7. DENTON (obstructive sleep apnea)    8. Primary hypertension    9. Vitamin D deficiency    Other orders  -      Discontinue: semaglutide (OZEMPIC) 2 mg/dose (8 mg/3 mL) PnIj; Inject 2 mg into the skin every 7 days.  Dispense: 3 mL; Refill: 11  -     Discontinue: levothyroxine (SYNTHROID) 100 MCG tablet; Take 1 tablet (100 mcg total) by mouth before breakfast.  Dispense: 90 tablet; Refill: 3          Discussed how to stay healthy including: diet, exercise, refraining from smoking and discussed screening exams / tests needed for age, sex and family Hx.    RTC 6 mo with labs    Daniel Olvera MD

## 2024-09-12 ENCOUNTER — PATIENT MESSAGE (OUTPATIENT)
Dept: FAMILY MEDICINE | Facility: CLINIC | Age: 69
End: 2024-09-12
Payer: MEDICARE

## 2024-09-17 ENCOUNTER — HOSPITAL ENCOUNTER (OUTPATIENT)
Dept: RADIOLOGY | Facility: HOSPITAL | Age: 69
Discharge: HOME | End: 2024-09-17
Attending: STUDENT IN AN ORGANIZED HEALTH CARE EDUCATION/TRAINING PROGRAM
Payer: MEDICARE

## 2024-09-17 DIAGNOSIS — Z12.31 SCREENING MAMMOGRAM FOR HIGH-RISK PATIENT: ICD-10-CM

## 2024-09-17 PROCEDURE — 77067 SCR MAMMO BI INCL CAD: CPT | Mod: 26,,, | Performed by: RADIOLOGY

## 2024-09-17 PROCEDURE — 77063 BREAST TOMOSYNTHESIS BI: CPT | Mod: 26,,, | Performed by: RADIOLOGY

## 2024-09-17 PROCEDURE — 77063 BREAST TOMOSYNTHESIS BI: CPT | Mod: TC,PN

## 2024-10-01 ENCOUNTER — OFFICE VISIT (OUTPATIENT)
Dept: FAMILY MEDICINE | Facility: CLINIC | Age: 69
End: 2024-10-01
Payer: MEDICARE

## 2024-10-01 VITALS
WEIGHT: 178.13 LBS | SYSTOLIC BLOOD PRESSURE: 128 MMHG | HEIGHT: 66 IN | OXYGEN SATURATION: 96 % | BODY MASS INDEX: 28.63 KG/M2 | DIASTOLIC BLOOD PRESSURE: 78 MMHG | TEMPERATURE: 98 F | HEART RATE: 73 BPM

## 2024-10-01 DIAGNOSIS — E11.9 TYPE 2 DIABETES MELLITUS WITHOUT COMPLICATION, WITHOUT LONG-TERM CURRENT USE OF INSULIN: ICD-10-CM

## 2024-10-01 DIAGNOSIS — M79.605 PAIN OF LEFT LOWER EXTREMITY: Primary | ICD-10-CM

## 2024-10-01 DIAGNOSIS — M79.662 PAIN OF LEFT LOWER LEG: ICD-10-CM

## 2024-10-01 DIAGNOSIS — I48.0 PAROXYSMAL A-FIB: ICD-10-CM

## 2024-10-01 DIAGNOSIS — Z23 NEED FOR INFLUENZA VACCINATION: ICD-10-CM

## 2024-10-01 DIAGNOSIS — I50.9 CONGESTIVE HEART FAILURE, UNSPECIFIED HF CHRONICITY, UNSPECIFIED HEART FAILURE TYPE: ICD-10-CM

## 2024-10-01 PROCEDURE — 3288F FALL RISK ASSESSMENT DOCD: CPT | Mod: CPTII,S$GLB,, | Performed by: PHYSICIAN ASSISTANT

## 2024-10-01 PROCEDURE — 1101F PT FALLS ASSESS-DOCD LE1/YR: CPT | Mod: CPTII,S$GLB,, | Performed by: PHYSICIAN ASSISTANT

## 2024-10-01 PROCEDURE — G0008 ADMIN INFLUENZA VIRUS VAC: HCPCS | Mod: S$GLB,,, | Performed by: PHYSICIAN ASSISTANT

## 2024-10-01 PROCEDURE — 3078F DIAST BP <80 MM HG: CPT | Mod: CPTII,S$GLB,, | Performed by: PHYSICIAN ASSISTANT

## 2024-10-01 PROCEDURE — 4010F ACE/ARB THERAPY RXD/TAKEN: CPT | Mod: CPTII,S$GLB,, | Performed by: PHYSICIAN ASSISTANT

## 2024-10-01 PROCEDURE — 99214 OFFICE O/P EST MOD 30 MIN: CPT | Mod: S$GLB,,, | Performed by: PHYSICIAN ASSISTANT

## 2024-10-01 PROCEDURE — 90653 IIV ADJUVANT VACCINE IM: CPT | Mod: S$GLB,,, | Performed by: PHYSICIAN ASSISTANT

## 2024-10-01 PROCEDURE — 3066F NEPHROPATHY DOC TX: CPT | Mod: CPTII,S$GLB,, | Performed by: PHYSICIAN ASSISTANT

## 2024-10-01 PROCEDURE — 1160F RVW MEDS BY RX/DR IN RCRD: CPT | Mod: CPTII,S$GLB,, | Performed by: PHYSICIAN ASSISTANT

## 2024-10-01 PROCEDURE — 3061F NEG MICROALBUMINURIA REV: CPT | Mod: CPTII,S$GLB,, | Performed by: PHYSICIAN ASSISTANT

## 2024-10-01 PROCEDURE — 3008F BODY MASS INDEX DOCD: CPT | Mod: CPTII,S$GLB,, | Performed by: PHYSICIAN ASSISTANT

## 2024-10-01 PROCEDURE — 1159F MED LIST DOCD IN RCRD: CPT | Mod: CPTII,S$GLB,, | Performed by: PHYSICIAN ASSISTANT

## 2024-10-01 PROCEDURE — 3044F HG A1C LEVEL LT 7.0%: CPT | Mod: CPTII,S$GLB,, | Performed by: PHYSICIAN ASSISTANT

## 2024-10-01 PROCEDURE — 3074F SYST BP LT 130 MM HG: CPT | Mod: CPTII,S$GLB,, | Performed by: PHYSICIAN ASSISTANT

## 2024-10-01 NOTE — PROGRESS NOTES
Patient ID: Deborah Gamboa is a 69 y.o. female.     Chief Complaint: Leg Pain and Knee Pain    69 year old female with history of DM2, PAF, and CHF presents to clinic with complaints of pain to the left lower leg for 1 month. Patient has had plain imaging. Worse with rest and ice. Better with ambulation. No relief with conservative measures. Taking all medication as prescribed.         Review of Systems  Review of Systems   Constitutional:  Negative for fever.   HENT:  Negative for ear pain and sinus pain.    Eyes:  Negative for discharge.   Respiratory:  Negative for cough and wheezing.    Cardiovascular:  Negative for chest pain and leg swelling.   Gastrointestinal:  Negative for diarrhea, nausea and vomiting.   Genitourinary:  Negative for urgency.   Musculoskeletal:  Positive for joint pain. Negative for myalgias.   Skin:  Negative for rash.   Neurological:  Negative for weakness and headaches.   Psychiatric/Behavioral:  Negative for depression.        Currently Medications  Current Outpatient Medications on File Prior to Visit   Medication Sig Dispense Refill    blood sugar diagnostic (TRUETEST TEST STRIPS) Strp       blood-glucose meter ([employer] BLOOD GLUC METER) Misc       DEXTROSE (GLUCOSE) Chew Take 1 tablet by mouth.      EUCALYPT/MEN/CAMPH/TURP/WH.PET (MEDICATED CHEST RUB TOP)       hydrALAZINE (APRESOLINE) 50 MG tablet Take 50 mg by mouth 3 (three) times daily.      lancets (TRUEPLUS LANCETS) 28 gauge Misc       levothyroxine (SYNTHROID) 100 MCG tablet Take 1 tablet (100 mcg total) by mouth before breakfast. 90 tablet 3    metoprolol succinate (TOPROL-XL) 25 MG 24 hr tablet Take 1 tablet by mouth.      multivit-min-FA-lycopen-lutein (CENTRUM SILVER) 0.4-300-250 mg-mcg-mcg Tab Take by mouth.      semaglutide (OZEMPIC) 2 mg/dose (8 mg/3 mL) PnIj Inject 2 mg into the skin every 7 days. 3 mL 11    sotaloL (BETAPACE) 120 MG Tab Take 80 mg by mouth 2 (two) times daily.      telmisartan (MICARDIS) 80 MG Tab  "Take 80 mg by mouth once daily.      vitamin D (VITAMIN D3) 1000 units Tab Take 1,000 Units by mouth once daily.      warfarin (COUMADIN) 5 MG tablet Take 5 mg by mouth once daily.      [DISCONTINUED] gabapentin (NEURONTIN) 800 MG tablet Take 1 tablet by mouth. (Patient not taking: Reported on 10/1/2024)       No current facility-administered medications on file prior to visit.       Physical  Exam  Vitals:    10/01/24 1043   BP: 128/78   BP Location: Right arm   Patient Position: Sitting   Pulse: 73   Temp: 97.9 °F (36.6 °C)   SpO2: 96%   Weight: 80.8 kg (178 lb 2.1 oz)   Height: 5' 6" (1.676 m)      Body mass index is 28.75 kg/m².  Wt Readings from Last 3 Encounters:   10/01/24 80.8 kg (178 lb 2.1 oz)   09/09/24 81.5 kg (179 lb 12.6 oz)   08/20/24 74.8 kg (165 lb)       Physical Exam    Labs:    Complete Blood Count  Lab Results   Component Value Date    RBC 4.81 10/02/2024    HGB 14.4 10/02/2024    HCT 44.1 10/02/2024    MCV 92 10/02/2024    MCH 29.9 10/02/2024    MCHC 32.7 10/02/2024    RDW 13.0 10/02/2024     10/02/2024    MPV 9.5 10/02/2024    GRAN 3.5 10/02/2024    GRAN 57.6 10/02/2024    LYMPH 1.8 10/02/2024    LYMPH 29.8 10/02/2024    MONO 0.7 10/02/2024    MONO 11.6 10/02/2024    EOS 0.0 10/02/2024    BASO 0.05 10/02/2024    EOSINOPHIL 0.0 10/02/2024    BASOPHIL 0.8 10/02/2024    DIFFMETHOD Automated 10/02/2024       Comprehensive Metabolic Panel  Lab Results   Component Value Date    GLU 77 10/02/2024    BUN 13 10/02/2024    CREATININE 0.72 10/02/2024    CREATININE 0.72 10/02/2024     10/02/2024    K 4.0 10/02/2024     10/02/2024    PROT 7.4 10/02/2024    ALBUMIN 4.4 10/02/2024    BILITOT 0.4 10/02/2024    AST 35 10/02/2024    ALKPHOS 56 10/02/2024    CO2 29 10/02/2024    ALT 26 10/02/2024    ANIONGAP 10 10/02/2024       TSH  Lab Results   Component Value Date    TSH 2.370 09/09/2024       Imaging:  Mammo Digital Screening Bilat w/ Dinh  Narrative: Facility:  [facility]  [address] [facility] [address] [facility]  [place] [address] [phone]    Name: Deborah Gamboa    MRN: [id number]    Result:  Mammo Digital Screening Bilat w/ Dinh    History:  Patient is 69 y.o. and is seen for a screening mammogram.    Films Compared:  Prior images (if available) were compared.     Findings:  This procedure was performed using tomosynthesis.   Computer-aided detection was utilized in the interpretation of this   examination.    There is no evidence of suspicious masses, microcalcifications or   architectural distortion.    Breast Density:  There are scattered areas of fibroglandular density.   Impression:    No mammographic evidence of malignancy.    BI-RADS Category 1: Negative    Recommendation:  Routine screening mammogram in 1 year is recommended.    Your estimated lifetime risk of breast cancer (to age 85) based on   Tyrer-Cuzick risk assessment model is 5.24%.  According to the American   Cancer Society, patients with a lifetime breast cancer risk of 20% or   higher might benefit from supplemental screening tests, such as screening   breast MRI.    Severiano Zelaya MD      Assessment/Plan:    1. Pain of left lower extremity  Comments:  - Follow pending MRI  Orders:  -     CREATININE, SERUM; Future; Expected date: 10/01/2024  -     CBC auto differential; Future; Expected date: 10/01/2024  -     Comprehensive metabolic panel; Future; Expected date: 10/01/2024  -     MRI Tibia Fibula With Contrast Left; Future; Expected date: 10/01/2024    2. Pain of left lower leg  Comments:  - Follow pending MRI  Orders:  -     CREATININE, SERUM; Future; Expected date: 10/01/2024  -     CBC auto differential; Future; Expected date: 10/01/2024  -     Comprehensive metabolic panel; Future; Expected date: 10/01/2024  -     MRI Tibia Fibula With Contrast Left; Future; Expected date: 10/01/2024    3. Type 2 diabetes mellitus without complication, without long-term current use of insulin  Comments:  - Chronic,  stable  - Continue mounjaro  - Follow with PCP    4. Paroxysmal A-fib  Comments:  - Chronic, stable  - Continue coumadin and sotalol  - Follow with PCP    5. Congestive heart failure, unspecified HF chronicity, unspecified heart failure type  Comments:  - Chronic, stable  - Continue metoprolol, betapace, telmisartan  - Follow with PCP    6. Need for influenza vaccination  -     influenza (adjuvanted) (Fluad) 45 mcg/0.5 mL IM vaccine (> or = 66 yo) 0.5 mL         Discussed how to stay healthy including: diet, exercise, refraining from smoking and discussed screening exams / tests needed for age, sex and family Hx.    RTC pending imaging    Imelda Dempsey PA-C

## 2024-10-02 ENCOUNTER — LAB VISIT (OUTPATIENT)
Dept: LAB | Facility: HOSPITAL | Age: 69
End: 2024-10-02
Attending: PHYSICIAN ASSISTANT
Payer: MEDICARE

## 2024-10-02 DIAGNOSIS — M79.605 PAIN OF LEFT LOWER EXTREMITY: ICD-10-CM

## 2024-10-02 DIAGNOSIS — M79.662 PAIN OF LEFT LOWER LEG: ICD-10-CM

## 2024-10-02 LAB
ALBUMIN SERPL BCP-MCNC: 4.4 G/DL (ref 3.5–5.2)
ALP SERPL-CCNC: 56 U/L (ref 38–126)
ALT SERPL W/O P-5'-P-CCNC: 26 U/L (ref 10–44)
ANION GAP SERPL CALC-SCNC: 10 MMOL/L (ref 8–16)
AST SERPL-CCNC: 35 U/L (ref 15–46)
BASOPHILS # BLD AUTO: 0.05 K/UL (ref 0–0.2)
BASOPHILS NFR BLD: 0.8 % (ref 0–1.9)
BILIRUB SERPL-MCNC: 0.4 MG/DL (ref 0.1–1)
CALCIUM SERPL-MCNC: 9.3 MG/DL (ref 8.7–10.5)
CHLORIDE SERPL-SCNC: 101 MMOL/L (ref 95–110)
CO2 SERPL-SCNC: 29 MMOL/L (ref 23–29)
CREAT SERPL-MCNC: 0.72 MG/DL (ref 0.5–1.4)
CREAT SERPL-MCNC: 0.72 MG/DL (ref 0.5–1.4)
DIFFERENTIAL METHOD BLD: NORMAL
EOSINOPHIL # BLD AUTO: 0 K/UL (ref 0–0.5)
EOSINOPHIL NFR BLD: 0 % (ref 0–8)
ERYTHROCYTE [DISTWIDTH] IN BLOOD BY AUTOMATED COUNT: 13 % (ref 11.5–14.5)
EST. GFR  (NO RACE VARIABLE): >60 ML/MIN/1.73 M^2
EST. GFR  (NO RACE VARIABLE): >60 ML/MIN/1.73 M^2
GLUCOSE SERPL-MCNC: 77 MG/DL (ref 70–110)
HCT VFR BLD AUTO: 44.1 % (ref 37–48.5)
HGB BLD-MCNC: 14.4 G/DL (ref 12–16)
IMM GRANULOCYTES # BLD AUTO: 0.01 K/UL (ref 0–0.04)
IMM GRANULOCYTES NFR BLD AUTO: 0.2 % (ref 0–0.5)
LYMPHOCYTES # BLD AUTO: 1.8 K/UL (ref 1–4.8)
LYMPHOCYTES NFR BLD: 29.8 % (ref 18–48)
MCH RBC QN AUTO: 29.9 PG (ref 27–31)
MCHC RBC AUTO-ENTMCNC: 32.7 G/DL (ref 32–36)
MCV RBC AUTO: 92 FL (ref 82–98)
MONOCYTES # BLD AUTO: 0.7 K/UL (ref 0.3–1)
MONOCYTES NFR BLD: 11.6 % (ref 4–15)
NEUTROPHILS # BLD AUTO: 3.5 K/UL (ref 1.8–7.7)
NEUTROPHILS NFR BLD: 57.6 % (ref 38–73)
NRBC BLD-RTO: 0 /100 WBC
PLATELET # BLD AUTO: 210 K/UL (ref 150–450)
PMV BLD AUTO: 9.5 FL (ref 9.2–12.9)
POTASSIUM SERPL-SCNC: 4 MMOL/L (ref 3.5–5.1)
PROT SERPL-MCNC: 7.4 G/DL (ref 6–8.4)
RBC # BLD AUTO: 4.81 M/UL (ref 4–5.4)
SODIUM SERPL-SCNC: 140 MMOL/L (ref 136–145)
UUN UR-MCNC: 13 MG/DL (ref 7–17)
WBC # BLD AUTO: 6.14 K/UL (ref 3.9–12.7)

## 2024-10-02 PROCEDURE — 36415 COLL VENOUS BLD VENIPUNCTURE: CPT | Mod: PN | Performed by: PHYSICIAN ASSISTANT

## 2024-10-02 PROCEDURE — 80053 COMPREHEN METABOLIC PANEL: CPT | Mod: PN | Performed by: PHYSICIAN ASSISTANT

## 2024-10-02 PROCEDURE — 85025 COMPLETE CBC W/AUTO DIFF WBC: CPT | Mod: PN | Performed by: PHYSICIAN ASSISTANT

## 2024-10-07 ENCOUNTER — TELEPHONE (OUTPATIENT)
Dept: FAMILY MEDICINE | Facility: CLINIC | Age: 69
End: 2024-10-07
Payer: MEDICARE

## 2024-10-07 NOTE — TELEPHONE ENCOUNTER
Needs info on type of pacer/material to determine if she can have MRI  SOBIA to be signed  Pacer in place. Dr. Chung with [facility]. [phone]  Aruna in [facility] - MRI

## 2024-10-07 NOTE — TELEPHONE ENCOUNTER
----- Message from Jesus sent at 10/7/2024 12:00 PM CDT -----  Contact: pt  Type: Requesting to speak with nurse        Who Called: PT  Regarding: would like to do a US instead of MRI. Says she can't be seen soon enough.  Would the patient rather a call back or a response via [facility]? Call back  Best Call Back Number: [phone]   Additional Information:

## 2024-10-10 ENCOUNTER — TELEPHONE (OUTPATIENT)
Dept: FAMILY MEDICINE | Facility: CLINIC | Age: 69
End: 2024-10-10
Payer: MEDICARE

## 2024-10-10 NOTE — TELEPHONE ENCOUNTER
Your results look fine and do not require any change in treatment.      Please contact me if you have any additional concerns.     Sincerely,     Imelda Dempsey

## 2024-10-14 ENCOUNTER — TELEPHONE (OUTPATIENT)
Dept: FAMILY MEDICINE | Facility: CLINIC | Age: 69
End: 2024-10-14
Payer: MEDICARE

## 2024-10-14 RX ORDER — PREDNISONE 20 MG/1
20 TABLET ORAL DAILY
Qty: 5 TABLET | Refills: 0 | Status: SHIPPED | OUTPATIENT
Start: 2024-10-14 | End: 2024-10-19

## 2024-10-14 NOTE — TELEPHONE ENCOUNTER
----- Message from Haeven sent at 10/14/2024  2:34 PM CDT -----  Type:  Needs Medical Advice    Who Called: Patient  Best Call Back Number: [phone]  Additional Information:     Patient is requesting a call back regarding the status of her MRI    Have you seen these results come across yet

## 2024-10-14 NOTE — TELEPHONE ENCOUNTER
I have spoken to pt she stated she has spoken to Erin the mri  with [facility]. She informed pt that the next available  MRI is not until January 2025. Pt stated she has gotten MRI's done before at [facility]. Pt also stated she will need a steroid called into [facility] in [place] because she is allergic to iodine.

## 2024-10-14 NOTE — TELEPHONE ENCOUNTER
Orders for MRI with attached pacemaker information has been faxed to [facility] at [phone]    Pt has been informed, and she has been informed that prednisone was sent to [facility] in [place]

## 2024-10-15 ENCOUNTER — TELEPHONE (OUTPATIENT)
Dept: FAMILY MEDICINE | Facility: CLINIC | Age: 69
End: 2024-10-15
Payer: MEDICARE

## 2024-10-15 DIAGNOSIS — M79.662 PAIN OF LEFT LOWER LEG: Primary | ICD-10-CM

## 2024-10-15 NOTE — TELEPHONE ENCOUNTER
I spoke with omero at [facility]  He agreed to do with and without contrast     Orders and authorization faxed to [phone]

## 2024-10-15 NOTE — TELEPHONE ENCOUNTER
Andie Toribio Staff  Caller: Unspecified (Today,  3:41 PM)  Type: Call    Who Called:Maninder/MRI [facility]  Does the patient know what this is regarding?:call  Would the patient rather a call back or a response via [facility]? call  Best Call Back Number:[phone]  Additional Information: MRI order is wrong, a stress fracture  in the lower leg does not need to be with contrast

## 2024-10-15 NOTE — TELEPHONE ENCOUNTER
----- Message from Marcial sent at 10/15/2024  2:19 PM CDT -----  .Type:  Needs Medical Advice    Who Called: pt    Would the patient rather a call back or a response via [facility]? Call back  Best Call Back Number: [phone]  Additional Information:     Pt stated that [facility] is sending her MRI order back because its not correct it supposed to say with and without contrast

## 2024-10-15 NOTE — TELEPHONE ENCOUNTER
----- Message from Jesus sent at 10/15/2024  2:21 PM CDT -----  Contact: [facility]  Type: Requesting to speak with nurse        Who Called: [facility]  Regarding: need new mri order with and without contrast  Would the patient rather a call back or a response via [facility]? Call back  Best Call Back Number: [phone]  Additional Information:

## 2024-10-15 NOTE — TELEPHONE ENCOUNTER
It does say with and without contrast; edit: the one dr. Olvera sent today. Printing can you fax please? Thanks

## 2024-10-17 ENCOUNTER — TELEPHONE (OUTPATIENT)
Dept: FAMILY MEDICINE | Facility: CLINIC | Age: 69
End: 2024-10-17
Payer: MEDICARE

## 2024-10-17 NOTE — TELEPHONE ENCOUNTER
----- Message from Livier sent at 10/17/2024  8:14 AM CDT -----  Type:  Needs Medical Advice    Who Called: Pt   Symptoms (please be specific): pt states that she would like to speak to Jaz regarding an xray and mri    Would the patient rather a call back or a response via [facility]?  Call back   Best Call Back Number: [phone]

## 2024-10-18 ENCOUNTER — TELEPHONE (OUTPATIENT)
Dept: FAMILY MEDICINE | Facility: CLINIC | Age: 69
End: 2024-10-18
Payer: MEDICARE

## 2024-10-18 NOTE — TELEPHONE ENCOUNTER
Pt walked into the clinic and was visibly upset. She stated that she's been trying for quite some time to have an MRI done, and was unsuccessful in doing so (within a timely manner) due to having a pacemaker implanted. I have been in communication with Maninder at [facility] (where pt was scheduled to have MRI today). I have since contacted [facility] and [facility]. The patient is now scheduled for 11/6/24 at 9 am to have MRI done at [facility] located at [address] [place] [address] in [place] (1st floor of [facility]). I spoke with representative named Arelis at [facility] to schedule this appointment. Arelis made aware that pt has pacemaker, as well as the  of pacemaker. All information including MRI order, pacemaker clearance form, MRI authorization and demographic data has been faxed to [facility] Centralized Scheduling. Fax confirmation has been received. Pt has been informed of MRI date/time, location and the name of the representative that I spoke with. Pt is pleased.

## 2024-10-21 ENCOUNTER — PATIENT MESSAGE (OUTPATIENT)
Dept: ADMINISTRATIVE | Facility: HOSPITAL | Age: 69
End: 2024-10-21
Payer: MEDICARE

## 2024-10-22 ENCOUNTER — PATIENT OUTREACH (OUTPATIENT)
Dept: ADMINISTRATIVE | Facility: HOSPITAL | Age: 69
End: 2024-10-22
Payer: MEDICARE

## 2024-10-22 NOTE — PROGRESS NOTES
Population Health Chart Review & Patient Outreach Details      Additional [facility] Notes:               Updates Requested / Reviewed:      Updated Care Coordination Note         Health Maintenance Topics Overdue:      VB Score: 2     Eye Exam  Foot Exam    Pneumonia Vaccine  Shingles/Zoster Vaccine  RSV Vaccine                  Health Maintenance Topic(s) Outreach Outcomes & Actions Taken:    Eye Exam - Outreach Outcomes & Actions Taken  : External Records Requested & Care Team Updated if Applicable

## 2024-10-22 NOTE — LETTER
AUTHORIZATION FOR RELEASE OF   CONFIDENTIAL INFORMATION    Dear Lalita Stapleton,    We are seeing Deborah Gamboa, date of birth 1955, in the clinic at [facility]. Daniel Olvera MD is the patient's PCP. Deborah Gamboa has an outstanding lab/procedure at the time we reviewed her chart. In order to help keep her health information updated, she has authorized us to request the following medical record(s):                                                ( X )  EYE EXAM                 Please fax records to [facility], Reine, Addy N., MD, [phone]              Patient Name: Deborah Gamboa  : 1955  Patient Phone #: [phone]

## 2024-11-08 ENCOUNTER — PATIENT MESSAGE (OUTPATIENT)
Dept: FAMILY MEDICINE | Facility: CLINIC | Age: 69
End: 2024-11-08
Payer: MEDICARE

## 2024-11-13 ENCOUNTER — PATIENT OUTREACH (OUTPATIENT)
Dept: ADMINISTRATIVE | Facility: HOSPITAL | Age: 69
End: 2024-11-13
Payer: MEDICARE

## 2024-11-13 NOTE — PROGRESS NOTES
Population Health Chart Review & Patient Outreach Details      Additional [facility] Health Notes:               Updates Requested / Reviewed:      Updated Care Coordination Note, Care Everywhere, and Immunizations Reconciliation Completed or Queried: [facility] Topics Overdue:      [facility] Score: 1     Foot Exam    Pneumonia Vaccine  Shingles/Zoster Vaccine  RSV Vaccine                  Health Maintenance Topic(s) Outreach Outcomes & Actions Taken:    Eye Exam - Outreach Outcomes & Actions Taken  : Diabetic Eye External Records Uploaded, Care Team & History Updated if Applicable

## 2025-03-03 ENCOUNTER — LAB VISIT (OUTPATIENT)
Dept: LAB | Facility: HOSPITAL | Age: 70
End: 2025-03-03
Attending: STUDENT IN AN ORGANIZED HEALTH CARE EDUCATION/TRAINING PROGRAM
Payer: MEDICARE

## 2025-03-03 DIAGNOSIS — I25.10 CORONARY ARTERY DISEASE INVOLVING NATIVE CORONARY ARTERY OF NATIVE HEART, UNSPECIFIED WHETHER ANGINA PRESENT: ICD-10-CM

## 2025-03-03 DIAGNOSIS — E11.9 TYPE 2 DIABETES MELLITUS WITHOUT COMPLICATION, WITHOUT LONG-TERM CURRENT USE OF INSULIN: ICD-10-CM

## 2025-03-03 LAB
ALBUMIN SERPL BCP-MCNC: 4 G/DL (ref 3.5–5.2)
ALP SERPL-CCNC: 55 U/L (ref 38–126)
ALT SERPL W/O P-5'-P-CCNC: 22 U/L (ref 10–44)
ANION GAP SERPL CALC-SCNC: 7 MMOL/L (ref 8–16)
AST SERPL-CCNC: 35 U/L (ref 15–46)
BASOPHILS # BLD AUTO: 0.04 K/UL (ref 0–0.2)
BASOPHILS NFR BLD: 0.7 % (ref 0–1.9)
BILIRUB SERPL-MCNC: 0.6 MG/DL (ref 0.1–1)
CALCIUM SERPL-MCNC: 9.2 MG/DL (ref 8.7–10.5)
CHLORIDE SERPL-SCNC: 103 MMOL/L (ref 95–110)
CHOLEST SERPL-MCNC: 175 MG/DL (ref 120–199)
CHOLEST/HDLC SERPL: 3.1 {RATIO} (ref 2–5)
CO2 SERPL-SCNC: 30 MMOL/L (ref 23–29)
CREAT SERPL-MCNC: 0.73 MG/DL (ref 0.5–1.4)
DIFFERENTIAL METHOD BLD: NORMAL
EOSINOPHIL # BLD AUTO: 0.2 K/UL (ref 0–0.5)
EOSINOPHIL NFR BLD: 2.9 % (ref 0–8)
ERYTHROCYTE [DISTWIDTH] IN BLOOD BY AUTOMATED COUNT: 12.7 % (ref 11.5–14.5)
EST. GFR  (NO RACE VARIABLE): >60 ML/MIN/1.73 M^2
ESTIMATED AVG GLUCOSE: 94 MG/DL (ref 68–131)
GLUCOSE SERPL-MCNC: 76 MG/DL (ref 70–110)
HBA1C MFR BLD: 4.9 % (ref 4–5.6)
HCT VFR BLD AUTO: 43.2 % (ref 37–48.5)
HDLC SERPL-MCNC: 56 MG/DL (ref 40–75)
HDLC SERPL: 32 % (ref 20–50)
HGB BLD-MCNC: 14 G/DL (ref 12–16)
IMM GRANULOCYTES # BLD AUTO: 0.01 K/UL (ref 0–0.04)
IMM GRANULOCYTES NFR BLD AUTO: 0.2 % (ref 0–0.5)
LDLC SERPL CALC-MCNC: 90.8 MG/DL (ref 63–159)
LYMPHOCYTES # BLD AUTO: 1.8 K/UL (ref 1–4.8)
LYMPHOCYTES NFR BLD: 33.8 % (ref 18–48)
MCH RBC QN AUTO: 29.9 PG (ref 27–31)
MCHC RBC AUTO-ENTMCNC: 32.4 G/DL (ref 32–36)
MCV RBC AUTO: 92 FL (ref 82–98)
MONOCYTES # BLD AUTO: 0.6 K/UL (ref 0.3–1)
MONOCYTES NFR BLD: 10.1 % (ref 4–15)
NEUTROPHILS # BLD AUTO: 2.8 K/UL (ref 1.8–7.7)
NEUTROPHILS NFR BLD: 52.3 % (ref 38–73)
NONHDLC SERPL-MCNC: 119 MG/DL
NRBC BLD-RTO: 0 /100 WBC
PLATELET # BLD AUTO: 215 K/UL (ref 150–450)
PMV BLD AUTO: 9.5 FL (ref 9.2–12.9)
POTASSIUM SERPL-SCNC: 4 MMOL/L (ref 3.5–5.1)
PROT SERPL-MCNC: 7.2 G/DL (ref 6–8.4)
RBC # BLD AUTO: 4.68 M/UL (ref 4–5.4)
SODIUM SERPL-SCNC: 140 MMOL/L (ref 136–145)
TRIGL SERPL-MCNC: 141 MG/DL (ref 30–150)
TSH SERPL DL<=0.005 MIU/L-ACNC: 0.82 UIU/ML (ref 0.4–4)
UUN UR-MCNC: 11 MG/DL (ref 7–17)
WBC # BLD AUTO: 5.44 K/UL (ref 3.9–12.7)

## 2025-03-03 PROCEDURE — 85025 COMPLETE CBC W/AUTO DIFF WBC: CPT | Mod: PN | Performed by: STUDENT IN AN ORGANIZED HEALTH CARE EDUCATION/TRAINING PROGRAM

## 2025-03-03 PROCEDURE — 36415 COLL VENOUS BLD VENIPUNCTURE: CPT | Mod: PN | Performed by: STUDENT IN AN ORGANIZED HEALTH CARE EDUCATION/TRAINING PROGRAM

## 2025-03-03 PROCEDURE — 84443 ASSAY THYROID STIM HORMONE: CPT | Mod: PN | Performed by: STUDENT IN AN ORGANIZED HEALTH CARE EDUCATION/TRAINING PROGRAM

## 2025-03-03 PROCEDURE — 83036 HEMOGLOBIN GLYCOSYLATED A1C: CPT | Performed by: STUDENT IN AN ORGANIZED HEALTH CARE EDUCATION/TRAINING PROGRAM

## 2025-03-03 PROCEDURE — 80053 COMPREHEN METABOLIC PANEL: CPT | Mod: PN | Performed by: STUDENT IN AN ORGANIZED HEALTH CARE EDUCATION/TRAINING PROGRAM

## 2025-03-03 PROCEDURE — 80061 LIPID PANEL: CPT | Performed by: STUDENT IN AN ORGANIZED HEALTH CARE EDUCATION/TRAINING PROGRAM

## 2025-03-04 ENCOUNTER — RESULTS FOLLOW-UP (OUTPATIENT)
Dept: FAMILY MEDICINE | Facility: CLINIC | Age: 70
End: 2025-03-04

## 2025-03-18 ENCOUNTER — OFFICE VISIT (OUTPATIENT)
Dept: FAMILY MEDICINE | Facility: CLINIC | Age: 70
End: 2025-03-18
Payer: MEDICARE

## 2025-03-18 VITALS
HEIGHT: 66 IN | TEMPERATURE: 98 F | WEIGHT: 180.56 LBS | DIASTOLIC BLOOD PRESSURE: 60 MMHG | SYSTOLIC BLOOD PRESSURE: 98 MMHG | HEART RATE: 86 BPM | BODY MASS INDEX: 29.02 KG/M2 | OXYGEN SATURATION: 97 %

## 2025-03-18 DIAGNOSIS — Z95.0 PACEMAKER: ICD-10-CM

## 2025-03-18 DIAGNOSIS — Z23 NEED FOR STREPTOCOCCUS PNEUMONIAE VACCINATION: ICD-10-CM

## 2025-03-18 DIAGNOSIS — I50.9 CONGESTIVE HEART FAILURE, UNSPECIFIED HF CHRONICITY, UNSPECIFIED HEART FAILURE TYPE: ICD-10-CM

## 2025-03-18 DIAGNOSIS — Z85.528 HISTORY OF KIDNEY CANCER: ICD-10-CM

## 2025-03-18 DIAGNOSIS — Z13.6 SCREENING FOR CARDIOVASCULAR CONDITION: ICD-10-CM

## 2025-03-18 DIAGNOSIS — E11.9 TYPE 2 DIABETES MELLITUS WITHOUT COMPLICATION, WITHOUT LONG-TERM CURRENT USE OF INSULIN: Primary | ICD-10-CM

## 2025-03-18 DIAGNOSIS — I48.0 PAROXYSMAL A-FIB: ICD-10-CM

## 2025-03-18 PROCEDURE — 3008F BODY MASS INDEX DOCD: CPT | Mod: CPTII,S$GLB,, | Performed by: STUDENT IN AN ORGANIZED HEALTH CARE EDUCATION/TRAINING PROGRAM

## 2025-03-18 PROCEDURE — 1159F MED LIST DOCD IN RCRD: CPT | Mod: CPTII,S$GLB,, | Performed by: STUDENT IN AN ORGANIZED HEALTH CARE EDUCATION/TRAINING PROGRAM

## 2025-03-18 PROCEDURE — 1160F RVW MEDS BY RX/DR IN RCRD: CPT | Mod: CPTII,S$GLB,, | Performed by: STUDENT IN AN ORGANIZED HEALTH CARE EDUCATION/TRAINING PROGRAM

## 2025-03-18 PROCEDURE — 3044F HG A1C LEVEL LT 7.0%: CPT | Mod: CPTII,S$GLB,, | Performed by: STUDENT IN AN ORGANIZED HEALTH CARE EDUCATION/TRAINING PROGRAM

## 2025-03-18 PROCEDURE — 99397 PER PM REEVAL EST PAT 65+ YR: CPT | Mod: 25,S$GLB,, | Performed by: STUDENT IN AN ORGANIZED HEALTH CARE EDUCATION/TRAINING PROGRAM

## 2025-03-18 PROCEDURE — 3078F DIAST BP <80 MM HG: CPT | Mod: CPTII,S$GLB,, | Performed by: STUDENT IN AN ORGANIZED HEALTH CARE EDUCATION/TRAINING PROGRAM

## 2025-03-18 PROCEDURE — 90677 PCV20 VACCINE IM: CPT | Mod: S$GLB,,, | Performed by: STUDENT IN AN ORGANIZED HEALTH CARE EDUCATION/TRAINING PROGRAM

## 2025-03-18 PROCEDURE — 1101F PT FALLS ASSESS-DOCD LE1/YR: CPT | Mod: CPTII,S$GLB,, | Performed by: STUDENT IN AN ORGANIZED HEALTH CARE EDUCATION/TRAINING PROGRAM

## 2025-03-18 PROCEDURE — 3074F SYST BP LT 130 MM HG: CPT | Mod: CPTII,S$GLB,, | Performed by: STUDENT IN AN ORGANIZED HEALTH CARE EDUCATION/TRAINING PROGRAM

## 2025-03-18 PROCEDURE — 3288F FALL RISK ASSESSMENT DOCD: CPT | Mod: CPTII,S$GLB,, | Performed by: STUDENT IN AN ORGANIZED HEALTH CARE EDUCATION/TRAINING PROGRAM

## 2025-03-18 PROCEDURE — 1126F AMNT PAIN NOTED NONE PRSNT: CPT | Mod: CPTII,S$GLB,, | Performed by: STUDENT IN AN ORGANIZED HEALTH CARE EDUCATION/TRAINING PROGRAM

## 2025-03-18 PROCEDURE — G0009 ADMIN PNEUMOCOCCAL VACCINE: HCPCS | Mod: S$GLB,,, | Performed by: STUDENT IN AN ORGANIZED HEALTH CARE EDUCATION/TRAINING PROGRAM

## 2025-03-19 PROBLEM — Z95.0 PACEMAKER: Status: ACTIVE | Noted: 2025-03-19

## 2025-03-19 PROBLEM — Z85.528 HISTORY OF KIDNEY CANCER: Status: ACTIVE | Noted: 2025-03-19

## 2025-03-19 NOTE — PROGRESS NOTES
" Patient ID: Deborah Gamboa is a 69 y.o. female.     Chief Complaint: f/u labs  Follow-up  Pertinent negatives include no chest pain, coughing, fever, headaches, myalgias, nausea, rash, vomiting or weakness.     History of Present Illness    Deborah presents today for follow up    She denies nausea, vomiting, chest pain, shortness of breath, or constipation. She reports regular bowel movements and denies any urinary problems.    She is up to date with eye exams with Dr. Stapleton and continues dermatology follow-up with Dr. Ross with no reported changes.    She received pneumonia vaccine in 2010 and reports prior shingles vaccination.    Recent labs, including thyroid function tests and cholesterol levels, were normal.         Review of Systems  Review of Systems   Constitutional:  Negative for fever.   HENT:  Negative for ear pain and sinus pain.    Eyes:  Negative for discharge.   Respiratory:  Negative for cough and shortness of breath.    Cardiovascular:  Negative for chest pain and leg swelling.   Gastrointestinal:  Negative for diarrhea, nausea and vomiting.   Genitourinary:  Negative for urgency.   Musculoskeletal:  Negative for myalgias.   Skin:  Negative for rash.   Neurological:  Negative for weakness and headaches.   Psychiatric/Behavioral:  Negative for depression.    All other systems reviewed and are negative.      Currently Medications  Medications Ordered Prior to Encounter[1]    Physical  Exam  Vitals:    03/18/25 1257   BP: 98/60   BP Location: Right arm   Patient Position: Sitting   Pulse: 86   Temp: 97.9 °F (36.6 °C)   SpO2: 97%   Weight: 81.9 kg (180 lb 8.9 oz)   Height: 5' 6" (1.676 m)      Body mass index is 29.14 kg/m².  Wt Readings from Last 3 Encounters:   03/18/25 81.9 kg (180 lb 8.9 oz)   10/01/24 80.8 kg (178 lb 2.1 oz)   09/09/24 81.5 kg (179 lb 12.6 oz)       Physical Exam  Vitals and nursing note reviewed.   Constitutional:       General: She is not in acute distress.     Appearance: " She is not ill-appearing.   HENT:      Head: Normocephalic and atraumatic.      Right Ear: External ear normal.      Left Ear: External ear normal.      Nose: Nose normal.      Mouth/Throat:      Mouth: Mucous membranes are moist.   Eyes:      Extraocular Movements: Extraocular movements intact.      Conjunctiva/sclera: Conjunctivae normal.   Cardiovascular:      Rate and Rhythm: Normal rate and regular rhythm.      Pulses: Normal pulses.      Heart sounds: No murmur heard.  Pulmonary:      Effort: Pulmonary effort is normal. No respiratory distress.      Breath sounds: No wheezing.   Abdominal:      General: There is no distension.      Palpations: Abdomen is soft. There is no mass.      Tenderness: There is no abdominal tenderness.   Musculoskeletal:         General: No swelling.      Cervical back: Normal range of motion.   Skin:     Coloration: Skin is not jaundiced.      Findings: No rash.   Neurological:      General: No focal deficit present.      Mental Status: She is alert and oriented to person, place, and time.   Psychiatric:         Mood and Affect: Mood normal.         Thought Content: Thought content normal.         Labs:    Complete Blood Count  Lab Results   Component Value Date    RBC 4.68 03/03/2025    HGB 14.0 03/03/2025    HCT 43.2 03/03/2025    MCV 92 03/03/2025    MCH 29.9 03/03/2025    MCHC 32.4 03/03/2025    RDW 12.7 03/03/2025     03/03/2025    MPV 9.5 03/03/2025    GRAN 2.8 03/03/2025    GRAN 52.3 03/03/2025    LYMPH 1.8 03/03/2025    LYMPH 33.8 03/03/2025    MONO 0.6 03/03/2025    MONO 10.1 03/03/2025    EOS 0.2 03/03/2025    BASO 0.04 03/03/2025    EOSINOPHIL 2.9 03/03/2025    BASOPHIL 0.7 03/03/2025    DIFFMETHOD Automated 03/03/2025       Comprehensive Metabolic Panel  Lab Results   Component Value Date    GLU 76 03/03/2025    BUN 11 03/03/2025    CREATININE 0.73 03/03/2025     03/03/2025    K 4.0 03/03/2025     03/03/2025    PROT 7.2 03/03/2025    ALBUMIN 4.0  03/03/2025    BILITOT 0.6 03/03/2025    AST 35 03/03/2025    ALKPHOS 55 03/03/2025    CO2 30 (H) 03/03/2025    ALT 22 03/03/2025    ANIONGAP 7 (L) 03/03/2025       TSH  Lab Results   Component Value Date    TSH 0.818 03/03/2025       Imaging:  Mammo Digital Screening Bilat w/ Dinh  Narrative: Facility:  [facility]  [address] [place] [address] [phone]    Name: Deborah Gamboa    MRN: [id number]    Result:  Mammo Digital Screening Bilat w/ Dinh    History:  Patient is 69 y.o. and is seen for a screening mammogram.    Films Compared:  Prior images (if available) were compared.     Findings:  This procedure was performed using tomosynthesis.   Computer-aided detection was utilized in the interpretation of this   examination.    There is no evidence of suspicious masses, microcalcifications or   architectural distortion.    Breast Density:  There are scattered areas of fibroglandular density.   Impression:    No mammographic evidence of malignancy.    BI-RADS Category 1: Negative    Recommendation:  Routine screening mammogram in 1 year is recommended.    Your estimated lifetime risk of breast cancer (to age 85) based on   Tyrer-Cuzick risk assessment model is 5.24%.  According to the American   Cancer Society, patients with a lifetime breast cancer risk of 20% or   higher might benefit from supplemental screening tests, such as screening   breast MRI.    Severiano Zelaya MD      Assessment/Plan:  Assessment & Plan    Recommend pneumonia vaccine (PCV20) due to age and recent hospital exposure.  Reviewed recent lab results, noting thyroid, cholesterol, and other values were WNL.    TYPE 2 DIABETES MELLITUS WITHOUT COMPLICATION, WITHOUT LONG-TERM CURRENT USE OF INSULIN:  - Continue Ozempic for diabetes management.  - Deborah reports no side effects and is instructed to maintain the current regimen.      1. Type 2 diabetes mellitus without complication, without long-term current  use of insulin  -     CBC Auto Differential; Future  -     Comprehensive Metabolic Panel; Future; Expected date: 03/18/2025  -     Hemoglobin A1C; Future; Expected date: 03/18/2025  -     TSH; Future; Expected date: 03/18/2025    2. Paroxysmal A-fib  -     TSH; Future; Expected date: 03/18/2025    3. Screening for cardiovascular condition  -     Lipid Panel; Future; Expected date: 03/18/2025    4. BMI 29.0-29.9,adult  Assessment & Plan:  Body mass index is 29.14 kg/m².  Discussed diet      5. History of kidney cancer  Assessment & Plan:  - in remission  - follows with nephrology      6. Congestive heart failure, unspecified HF chronicity, unspecified heart failure type  Assessment & Plan:  - chronic  - euvolemic on exam      7. Need for Streptococcus pneumoniae vaccination  -     pneumoc 20-margot conj-dip cr(PF) (PREVNAR-20 (PF)) injection Syrg 0.5 mL    8. Pacemaker         Discussed how to stay healthy including: diet, exercise, refraining from smoking and discussed screening exams / tests needed for age, sex and family Hx.    RTC 6 mo    Daniel Olvera MD    This note was generated with the assistance of ambient listening technology. Verbal consent was obtained by the patient and accompanying visitor(s) for the recording of patient appointment to facilitate this note. I attest to having reviewed and edited the generated note for accuracy, though some syntax or spelling errors may persist. Please contact the author of this note for any clarification.           [1]   Current Outpatient Medications on File Prior to Visit   Medication Sig Dispense Refill    blood sugar diagnostic (TRUETEST TEST STRIPS) Strp       blood-glucose meter ([employer] BLOOD GLUC METER) Misc       DEXTROSE (GLUCOSE) Chew Take 1 tablet by mouth.      EUCALYPT/MEN/CAMPH/TURP/WH.PET (MEDICATED CHEST RUB TOP)       hydrALAZINE (APRESOLINE) 50 MG tablet Take 50 mg by mouth 3 (three) times daily.      lancets (TRUEPLUS LANCETS) 28 gauge Misc        levothyroxine (SYNTHROID) 100 MCG tablet Take 1 tablet (100 mcg total) by mouth before breakfast. 90 tablet 3    metoprolol succinate (TOPROL-XL) 25 MG 24 hr tablet Take 1 tablet by mouth.      multivit-min-FA-lycopen-lutein (CENTRUM SILVER) 0.4-300-250 mg-mcg-mcg Tab Take by mouth.      semaglutide (OZEMPIC) 2 mg/dose (8 mg/3 mL) PnIj Inject 2 mg into the skin every 7 days. 3 mL 11    sotaloL (BETAPACE) 120 MG Tab Take 80 mg by mouth 2 (two) times daily.      telmisartan (MICARDIS) 80 MG Tab Take 80 mg by mouth once daily.      vitamin D (VITAMIN D3) 1000 units Tab Take 1,000 Units by mouth once daily.      warfarin (COUMADIN) 5 MG tablet Take 5 mg by mouth once daily.       No current facility-administered medications on file prior to visit.

## 2025-08-25 DIAGNOSIS — N89.8 VAGINAL DRYNESS: Primary | ICD-10-CM

## 2025-08-25 DIAGNOSIS — N94.10 PAIN IN FEMALE GENITALIA ON INTERCOURSE: ICD-10-CM

## 2025-08-25 RX ORDER — ESTRADIOL 0.1 MG/G
1 CREAM VAGINAL DAILY
Qty: 42.5 G | Refills: 5 | Status: SHIPPED | OUTPATIENT
Start: 2025-08-25

## 2025-09-02 RX ORDER — LEVOTHYROXINE SODIUM 100 UG/1
100 TABLET ORAL EVERY MORNING
Qty: 90 TABLET | Refills: 1 | Status: SHIPPED | OUTPATIENT
Start: 2025-09-02